# Patient Record
Sex: FEMALE | Race: BLACK OR AFRICAN AMERICAN | HISPANIC OR LATINO | Employment: FULL TIME | URBAN - METROPOLITAN AREA
[De-identification: names, ages, dates, MRNs, and addresses within clinical notes are randomized per-mention and may not be internally consistent; named-entity substitution may affect disease eponyms.]

---

## 2018-06-03 ENCOUNTER — HOSPITAL ENCOUNTER (INPATIENT)
Facility: HOSPITAL | Age: 38
LOS: 2 days | Discharge: HOME | DRG: 176 | End: 2018-06-05
Attending: EMERGENCY MEDICINE | Admitting: EMERGENCY MEDICINE

## 2018-06-03 DIAGNOSIS — R07.9 CHEST PAIN: ICD-10-CM

## 2018-06-03 DIAGNOSIS — I50.30 (HFPEF) HEART FAILURE WITH PRESERVED EJECTION FRACTION: ICD-10-CM

## 2018-06-03 DIAGNOSIS — I26.99 OTHER PULMONARY EMBOLISM WITHOUT ACUTE COR PULMONALE, UNSPECIFIED CHRONICITY: ICD-10-CM

## 2018-06-03 DIAGNOSIS — R79.89 ELEVATED LFTS: ICD-10-CM

## 2018-06-03 DIAGNOSIS — R07.9 RIGHT-SIDED CHEST PAIN: ICD-10-CM

## 2018-06-03 DIAGNOSIS — I26.99 PULMONARY EMBOLI: ICD-10-CM

## 2018-06-03 DIAGNOSIS — R06.02 SHORTNESS OF BREATH: ICD-10-CM

## 2018-06-03 DIAGNOSIS — I26.99 OTHER ACUTE PULMONARY EMBOLISM WITHOUT ACUTE COR PULMONALE: Primary | ICD-10-CM

## 2018-06-03 LAB
ALBUMIN SERPL BCP-MCNC: 3.2 G/DL
ALP SERPL-CCNC: 146 U/L
ALT SERPL W/O P-5'-P-CCNC: 53 U/L
ANION GAP SERPL CALC-SCNC: 10 MMOL/L
APTT BLDCRRT: 24.7 SEC
APTT BLDCRRT: 30.3 SEC
AST SERPL-CCNC: 62 U/L
B-HCG UR QL: NEGATIVE
BACTERIA #/AREA URNS HPF: NORMAL /HPF
BASOPHILS # BLD AUTO: 0.02 K/UL
BASOPHILS NFR BLD: 0.2 %
BILIRUB SERPL-MCNC: 0.8 MG/DL
BILIRUB UR QL STRIP: NEGATIVE
BUN SERPL-MCNC: 6 MG/DL
CALCIUM SERPL-MCNC: 9.7 MG/DL
CHLORIDE SERPL-SCNC: 95 MMOL/L
CLARITY UR: CLEAR
CO2 SERPL-SCNC: 26 MMOL/L
COLOR UR: YELLOW
CREAT SERPL-MCNC: 0.8 MG/DL
CTP QC/QA: YES
D DIMER PPP IA.FEU-MCNC: 2.19 MG/L FEU
DIFFERENTIAL METHOD: ABNORMAL
EOSINOPHIL # BLD AUTO: 0.2 K/UL
EOSINOPHIL NFR BLD: 2.1 %
ERYTHROCYTE [DISTWIDTH] IN BLOOD BY AUTOMATED COUNT: 14.7 %
EST. GFR  (AFRICAN AMERICAN): >60 ML/MIN/1.73 M^2
EST. GFR  (NON AFRICAN AMERICAN): >60 ML/MIN/1.73 M^2
GLUCOSE SERPL-MCNC: 371 MG/DL
GLUCOSE UR QL STRIP: ABNORMAL
HCT VFR BLD AUTO: 41.3 %
HGB BLD-MCNC: 14.4 G/DL
HGB UR QL STRIP: NEGATIVE
INR PPP: 0.9
KETONES UR QL STRIP: NEGATIVE
LEUKOCYTE ESTERASE UR QL STRIP: NEGATIVE
LIPASE SERPL-CCNC: 10 U/L
LYMPHOCYTES # BLD AUTO: 2.3 K/UL
LYMPHOCYTES NFR BLD: 22.9 %
MCH RBC QN AUTO: 32 PG
MCHC RBC AUTO-ENTMCNC: 34.9 G/DL
MCV RBC AUTO: 92 FL
MICROSCOPIC COMMENT: NORMAL
MONOCYTES # BLD AUTO: 1.3 K/UL
MONOCYTES NFR BLD: 12.7 %
NEUTROPHILS # BLD AUTO: 6.2 K/UL
NEUTROPHILS NFR BLD: 62.1 %
NITRITE UR QL STRIP: NEGATIVE
PH UR STRIP: 7 [PH] (ref 5–8)
PLATELET # BLD AUTO: 122 K/UL
PMV BLD AUTO: 10.7 FL
POCT GLUCOSE: 349 MG/DL (ref 70–110)
POTASSIUM SERPL-SCNC: 3.9 MMOL/L
PROT SERPL-MCNC: 7.3 G/DL
PROT UR QL STRIP: NEGATIVE
PROTHROMBIN TIME: 9.7 SEC
RBC # BLD AUTO: 4.5 M/UL
SODIUM SERPL-SCNC: 131 MMOL/L
SP GR UR STRIP: 1.03 (ref 1–1.03)
SQUAMOUS #/AREA URNS HPF: 1 /HPF
TROPONIN I SERPL DL<=0.01 NG/ML-MCNC: <0.006 NG/ML
URN SPEC COLLECT METH UR: ABNORMAL
UROBILINOGEN UR STRIP-ACNC: ABNORMAL EU/DL
WBC # BLD AUTO: 9.96 K/UL
WBC #/AREA URNS HPF: 1 /HPF (ref 0–5)
YEAST URNS QL MICRO: NORMAL

## 2018-06-03 PROCEDURE — 81000 URINALYSIS NONAUTO W/SCOPE: CPT

## 2018-06-03 PROCEDURE — 63600175 PHARM REV CODE 636 W HCPCS: Performed by: EMERGENCY MEDICINE

## 2018-06-03 PROCEDURE — 25000003 PHARM REV CODE 250: Performed by: EMERGENCY MEDICINE

## 2018-06-03 PROCEDURE — 93306 TTE W/DOPPLER COMPLETE: CPT

## 2018-06-03 PROCEDURE — 63600175 PHARM REV CODE 636 W HCPCS: Performed by: HOSPITALIST

## 2018-06-03 PROCEDURE — 27000221 HC OXYGEN, UP TO 24 HOURS

## 2018-06-03 PROCEDURE — 81025 URINE PREGNANCY TEST: CPT

## 2018-06-03 PROCEDURE — 36415 COLL VENOUS BLD VENIPUNCTURE: CPT

## 2018-06-03 PROCEDURE — 81025 URINE PREGNANCY TEST: CPT | Performed by: NURSE PRACTITIONER

## 2018-06-03 PROCEDURE — 85730 THROMBOPLASTIN TIME PARTIAL: CPT | Mod: 91

## 2018-06-03 PROCEDURE — 94761 N-INVAS EAR/PLS OXIMETRY MLT: CPT

## 2018-06-03 PROCEDURE — 85610 PROTHROMBIN TIME: CPT

## 2018-06-03 PROCEDURE — 96361 HYDRATE IV INFUSION ADD-ON: CPT

## 2018-06-03 PROCEDURE — 85730 THROMBOPLASTIN TIME PARTIAL: CPT

## 2018-06-03 PROCEDURE — 93010 ELECTROCARDIOGRAM REPORT: CPT | Mod: ,,, | Performed by: INTERNAL MEDICINE

## 2018-06-03 PROCEDURE — 83690 ASSAY OF LIPASE: CPT

## 2018-06-03 PROCEDURE — 84484 ASSAY OF TROPONIN QUANT: CPT

## 2018-06-03 PROCEDURE — 96374 THER/PROPH/DIAG INJ IV PUSH: CPT

## 2018-06-03 PROCEDURE — 96375 TX/PRO/DX INJ NEW DRUG ADDON: CPT

## 2018-06-03 PROCEDURE — 85025 COMPLETE CBC W/AUTO DIFF WBC: CPT

## 2018-06-03 PROCEDURE — 85379 FIBRIN DEGRADATION QUANT: CPT

## 2018-06-03 PROCEDURE — 21400001 HC TELEMETRY ROOM

## 2018-06-03 PROCEDURE — 99285 EMERGENCY DEPT VISIT HI MDM: CPT | Mod: 25

## 2018-06-03 PROCEDURE — 25500020 PHARM REV CODE 255: Performed by: EMERGENCY MEDICINE

## 2018-06-03 PROCEDURE — 93306 TTE W/DOPPLER COMPLETE: CPT | Mod: 26,,, | Performed by: INTERNAL MEDICINE

## 2018-06-03 PROCEDURE — 80053 COMPREHEN METABOLIC PANEL: CPT

## 2018-06-03 RX ORDER — INSULIN ASPART 100 [IU]/ML
0-5 INJECTION, SOLUTION INTRAVENOUS; SUBCUTANEOUS
Status: DISCONTINUED | OUTPATIENT
Start: 2018-06-03 | End: 2018-06-03

## 2018-06-03 RX ORDER — IBUPROFEN 200 MG
16 TABLET ORAL
Status: DISCONTINUED | OUTPATIENT
Start: 2018-06-03 | End: 2018-06-05 | Stop reason: HOSPADM

## 2018-06-03 RX ORDER — INSULIN ASPART 100 [IU]/ML
1-10 INJECTION, SOLUTION INTRAVENOUS; SUBCUTANEOUS
Status: DISCONTINUED | OUTPATIENT
Start: 2018-06-03 | End: 2018-06-05 | Stop reason: HOSPADM

## 2018-06-03 RX ORDER — GLUCAGON 1 MG
1 KIT INJECTION
Status: DISCONTINUED | OUTPATIENT
Start: 2018-06-03 | End: 2018-06-05 | Stop reason: HOSPADM

## 2018-06-03 RX ORDER — MORPHINE SULFATE 10 MG/ML
4 INJECTION INTRAMUSCULAR; INTRAVENOUS; SUBCUTANEOUS EVERY 4 HOURS PRN
Status: DISCONTINUED | OUTPATIENT
Start: 2018-06-03 | End: 2018-06-05 | Stop reason: HOSPADM

## 2018-06-03 RX ORDER — WARFARIN SODIUM 5 MG/1
10 TABLET ORAL DAILY
Status: DISCONTINUED | OUTPATIENT
Start: 2018-06-03 | End: 2018-06-04

## 2018-06-03 RX ORDER — SODIUM CHLORIDE 9 MG/ML
INJECTION, SOLUTION INTRAVENOUS CONTINUOUS
Status: DISCONTINUED | OUTPATIENT
Start: 2018-06-03 | End: 2018-06-04

## 2018-06-03 RX ORDER — KETOROLAC TROMETHAMINE 30 MG/ML
10 INJECTION, SOLUTION INTRAMUSCULAR; INTRAVENOUS
Status: COMPLETED | OUTPATIENT
Start: 2018-06-03 | End: 2018-06-03

## 2018-06-03 RX ORDER — HEPARIN SODIUM,PORCINE/D5W 25000/250
16 INTRAVENOUS SOLUTION INTRAVENOUS CONTINUOUS
Status: DISCONTINUED | OUTPATIENT
Start: 2018-06-03 | End: 2018-06-04

## 2018-06-03 RX ORDER — IBUPROFEN 200 MG
24 TABLET ORAL
Status: DISCONTINUED | OUTPATIENT
Start: 2018-06-03 | End: 2018-06-05 | Stop reason: HOSPADM

## 2018-06-03 RX ADMIN — HEPARIN SODIUM 18 UNITS/KG/HR: 10000 INJECTION, SOLUTION INTRAVENOUS at 07:06

## 2018-06-03 RX ADMIN — SODIUM CHLORIDE 1000 ML: 0.9 INJECTION, SOLUTION INTRAVENOUS at 10:06

## 2018-06-03 RX ADMIN — IOHEXOL 75 ML: 350 INJECTION, SOLUTION INTRAVENOUS at 09:06

## 2018-06-03 RX ADMIN — MORPHINE SULFATE 4 MG: 10 INJECTION INTRAVENOUS at 09:06

## 2018-06-03 RX ADMIN — HEPARIN SODIUM 16 UNITS/KG/HR: 10000 INJECTION, SOLUTION INTRAVENOUS at 11:06

## 2018-06-03 RX ADMIN — SODIUM CHLORIDE: 0.9 INJECTION, SOLUTION INTRAVENOUS at 09:06

## 2018-06-03 RX ADMIN — INSULIN ASPART 4 UNITS: 100 INJECTION, SOLUTION INTRAVENOUS; SUBCUTANEOUS at 09:06

## 2018-06-03 RX ADMIN — KETOROLAC TROMETHAMINE 10 MG: 30 INJECTION, SOLUTION INTRAMUSCULAR at 09:06

## 2018-06-03 RX ADMIN — SODIUM CHLORIDE: 0.9 INJECTION, SOLUTION INTRAVENOUS at 01:06

## 2018-06-03 RX ADMIN — WARFARIN SODIUM 10 MG: 5 TABLET ORAL at 05:06

## 2018-06-03 NOTE — SUBJECTIVE & OBJECTIVE
Past Medical History:   Diagnosis Date    Diabetes mellitus     Hypertension     Vaginal delivery     pt reported       History reviewed. No pertinent surgical history.    Review of patient's allergies indicates:  No Known Allergies    No current facility-administered medications on file prior to encounter.      No current outpatient prescriptions on file prior to encounter.     Family History     None        Social History Main Topics    Smoking status: Current Every Day Smoker     Packs/day: 0.50     Types: Cigarettes    Smokeless tobacco: Current User    Alcohol use No    Drug use: No    Sexual activity: Not on file     Review of Systems   Constitutional: Negative for activity change and appetite change.   HENT: Negative for congestion and dental problem.    Eyes: Negative for discharge and itching.   Respiratory: Negative for apnea and chest tightness.    Cardiovascular: Positive for chest pain. Negative for leg swelling.   Gastrointestinal: Negative for abdominal distention and abdominal pain.   Endocrine: Negative for cold intolerance and heat intolerance.   Genitourinary: Negative for difficulty urinating and dyspareunia.   Musculoskeletal: Negative for arthralgias and back pain.   Skin: Negative for color change and pallor.   Allergic/Immunologic: Negative for environmental allergies and food allergies.   Neurological: Negative for dizziness.   Hematological: Negative for adenopathy. Does not bruise/bleed easily.   Psychiatric/Behavioral: Negative for agitation and behavioral problems.     Objective:     Vital Signs (Most Recent):  Temp: 98.4 °F (36.9 °C) (06/03/18 0751)  Pulse: 66 (06/03/18 1057)  Resp: 20 (06/03/18 0751)  BP: 130/76 (06/03/18 1012)  SpO2: 100 % (06/03/18 1057) Vital Signs (24h Range):  Temp:  [98.4 °F (36.9 °C)] 98.4 °F (36.9 °C)  Pulse:  [66-99] 66  Resp:  [20] 20  SpO2:  [97 %-100 %] 100 %  BP: (121-141)/(72-78) 130/76     Weight: 65.8 kg (145 lb)  Body mass index is 24.13  kg/m².    Physical Exam   Constitutional: She is oriented to person, place, and time. No distress.   Eyes: EOM are normal. Pupils are equal, round, and reactive to light.   Neck: Normal range of motion. Neck supple.   Cardiovascular: Normal rate and regular rhythm.    Pulmonary/Chest: Effort normal and breath sounds normal.   Abdominal: Soft. Bowel sounds are normal.   Musculoskeletal: Normal range of motion. She exhibits no edema or deformity.   Neurological: She is oriented to person, place, and time. No cranial nerve deficit. Coordination normal.   Skin: Skin is warm and dry. She is not diaphoretic.   Psychiatric: She has a normal mood and affect. Her behavior is normal.         CRANIAL NERVES     CN III, IV, VI   Pupils are equal, round, and reactive to light.  Extraocular motions are normal.        Significant Labs:   BMP:   Recent Labs  Lab 06/03/18  0841   *   *   K 3.9   CL 95   CO2 26   BUN 6   CREATININE 0.8   CALCIUM 9.7     CBC:   Recent Labs  Lab 06/03/18  0841   WBC 9.96   HGB 14.4   HCT 41.3   *       Significant Imaging: reviewed.

## 2018-06-03 NOTE — ED NOTES
Transported to floor with transport team on stretcher, with personal belongings, on oxygen, with heparin infusing, on monitor, with chart

## 2018-06-03 NOTE — ED PROVIDER NOTES
Encounter Date: 6/3/2018       History     Chief Complaint   Patient presents with    Chest Pain     present to the ER with c/o R rib pain radiating to mid/upper back/shoulder, symptoms since 5 days,     Chief complaint:  Chest pain    History of present illness:  Patient is a 38-year-old female who speaks Ken[place] only who reports 5 days of right rib pain is intermittent.  She reports nothing is alleviate this pain and it radiates to the mid upper back and shoulders.  She reports current severity of pain of 10/10.  She states there is subjective fever, chills, cough.  Denies urinary symptoms such as frequency or urgency dysuria hematuria.  She states the pain is worse with a deep breath or cough.      The history is provided by the patient. No  was used.     Review of patient's allergies indicates:  No Known Allergies  Past Medical History:   Diagnosis Date    Diabetes mellitus     Hypertension     Vaginal delivery     pt reported     History reviewed. No pertinent surgical history.  History reviewed. No pertinent family history.  Social History   Substance Use Topics    Smoking status: Current Every Day Smoker     Packs/day: 0.50     Types: Cigarettes    Smokeless tobacco: Current User    Alcohol use No     Review of Systems   Constitutional: Positive for chills and fever (Subjective). Negative for fatigue.   HENT: Negative for congestion, ear discharge, ear pain, postnasal drip, rhinorrhea, sinus pressure, sneezing, sore throat and voice change.    Eyes: Negative for discharge and itching.   Respiratory: Positive for cough. Negative for shortness of breath and wheezing.    Cardiovascular: Negative for chest pain, palpitations and leg swelling.   Gastrointestinal: Negative for abdominal pain, constipation, diarrhea, nausea and vomiting.   Endocrine: Negative for polydipsia, polyphagia and polyuria.   Genitourinary: Negative for dysuria, frequency, hematuria, urgency, vaginal bleeding,  vaginal discharge and vaginal pain.   Musculoskeletal: Negative for arthralgias and myalgias.   Skin: Negative for rash and wound.   Neurological: Negative for dizziness, seizures, syncope, weakness and numbness.   Hematological: Negative for adenopathy. Does not bruise/bleed easily.   Psychiatric/Behavioral: Negative for self-injury and suicidal ideas. The patient is not nervous/anxious.        Physical Exam     Initial Vitals [06/03/18 0751]   BP Pulse Resp Temp SpO2   (!) 141/78 99 20 98.4 °F (36.9 °C) 100 %      MAP       99         Physical Exam    Nursing note and vitals reviewed.  Constitutional: She appears well-developed and well-nourished.   HENT:   Head: Normocephalic and atraumatic.   Right Ear: External ear normal.   Left Ear: External ear normal.   Nose: Nose normal.   Eyes: Conjunctivae and EOM are normal. Pupils are equal, round, and reactive to light. Right eye exhibits no discharge. Left eye exhibits no discharge.   Neck: Normal range of motion.   Cardiovascular: Regular rhythm, S1 normal, S2 normal and normal heart sounds. Exam reveals no gallop.    No murmur heard.  Pulmonary/Chest: Effort normal and breath sounds normal. No respiratory distress. She has no decreased breath sounds. She has no wheezes. She has no rhonchi. She has no rales.   Abdominal: She exhibits no distension.   Musculoskeletal: Normal range of motion.   Neurological: She is alert and oriented to person, place, and time.   Skin: Skin is dry. Capillary refill takes less than 2 seconds.   Skin is mildly diaphoretic.         ED Course   Procedures  Labs Reviewed   CBC W/ AUTO DIFFERENTIAL   COMPREHENSIVE METABOLIC PANEL   URINALYSIS, REFLEX TO URINE CULTURE   TROPONIN I   D DIMER, QUANTITATIVE   POCT URINE PREGNANCY     EKG Readings: (Independently Interpreted)   Initial Reading: No STEMI. Rhythm: Sinus Tachycardia. Heart Rate: 102. Ectopy: No Ectopy.                APC / Resident Notes:   MEDICAL DECISION MAKING    INITIAL  ASSESSMENT:  Patient is a 38-year-old female who presents with 5 days of right rib pain is intermittent.  States it radiates to the mid upper back and shoulder.  Nothing alleviates.  Aggravated by deep breath or cough.  Current severity pain is 10/10.  She reports subjective fever and chills.    Patient is afebrile, nontoxic, mildly diaphoretic, appears uncomfortable in treatment room in sitting position.  Her  accompanies her.  Heart regular rate and rhythm no murmurs clicks or rubs.  Lungs bilateral breath sounds are clear auscultation. Vital signs are reassuring.    ED COURSE:  I have ordered CBC, chemistry, urinalysis, UPT, EKG, troponin and chest x-ray    DIFFERENTIAL DIAGNOSES:  Pleuritic chest pain, rib fracture, pneumonia, ACS    PHYSICIAN INTERACTION:SBAR given to SHARONDA Tate MD at 0838. My care ends now.          Attending Attestation:     Physician Attestation Statement for NP/PA:   I have conducted a face to face encounter with this patient in addition to the NP/PA, due to Medical Complexity    Other NP/PA Attestation Additions:      Medical Decision Making: I have reviewed the case with my DANIEL and agree with the history, review of systems, physical exam, assessment and plan of care as documented by my advance practice clinician.    The results and physical exam findings were reviewed with the patient. Pt agrees with assessment, disposition and treatment plan and has no further questions or complaints at this time.    SHARONDA Tate M.D. 10:55 AM 6/3/2018           Attending ED Notes:   Pt with RUQ pain radiating into right back. Symptoms for 5 days. Acutely worse yesterday.  DDimer elevated. CT with PE and possible infarction. Starting heparin gtt. Risk factor is estrogen implant. Will obtain US lower ext.   I discussed the patient's presentation and workup with the hospitalist who agrees with placing the patient in the hospital.  I have placed orders for the hospitalist.   The results and  physical exam findings were reviewed with the patient. Pt agrees with assessment, disposition and treatment plan and has no further questions or complaints at this time.    SHARONDA Tate M.D. 10:55 AM 6/3/2018               Clinical Impression:   The primary encounter diagnosis was Other acute pulmonary embolism without acute cor pulmonale. Diagnoses of Chest pain and Right-sided chest pain were also pertinent to this visit.    X-Ray Chest PA And Lateral    (Results Pending)                              Fernando Tate MD  06/03/18 3940

## 2018-06-03 NOTE — ED TRIAGE NOTES
Pt presented to the ER for right sided rib pain that started five nights ago, last night the pain spread to her back and right arm. Pt reports nausea and vomiting all day on friday, but denies diarrhea.

## 2018-06-03 NOTE — ASSESSMENT & PLAN NOTE
She has been started on heparin infusion,she denies personal and family history of blood clot,no recent hospitalization or fracture or long hospital or travel,but she has left arm Nexplanon implant for birth control,for about  one year,culpprit for PE,she is stable on NC O 2.  Consult surgery to remove implant,  Check leg for DVT.  Echo to R/O heat strain.

## 2018-06-03 NOTE — ED NOTES
Pt transferred to room 11 in the main ED, report given to Karolina PLUNKETT. Pt ambulated to the bathroom for urine sample.

## 2018-06-03 NOTE — H&P
[facility] Hospital Medicine  History & Physical    Patient Name: Elyssa Parra  MRN: [id number]  Admission Date: 6/3/2018  Attending Physician: Fernando Tate MD   Primary Care Provider: To Obtain Unable         Patient information was obtained from patient and ER records.     Subjective:     Principal Problem:Pulmonary embolus    Chief Complaint:   Chief Complaint   Patient presents with    Chest Pain     present to the ER with c/o R rib pain radiating to mid/upper back/shoulder, symptoms since 5 days,        HPI: Patient is a 38-year-old female with past medical history of DM,who speaks [place]an only who reports 5 days of right rib pain is intermittent.  She reports nothing is alleviate this pain and it radiates to the mid upper back and shoulders.  She reports current severity of pain of 10/10.  She states there is subjective fever, chills, cough.  Denies urinary symptoms such as frequency or urgency dysuria hematuria.  She states the pain is worse with a deep breath or cough.CTA chest  Show,  Extensive pulmonary embolism involving the lobar arteries throughout the right lung.  No evidence of right heart strain.  There is associated opacity in the right lower lobe which may represent pulmonary infarction versus infectious/inflammatory process.  She has been started on heparin infusion,she denies personal and family history of blood clot,no recent hospitalization or fracture or long hospital or travel,but she has left arm Nexplanon implant for birth control,for about  one year,culpprit for PE,she is stable on NC O 2.    Past Medical History:   Diagnosis Date    Diabetes mellitus     Hypertension     Vaginal delivery     pt reported       History reviewed. No pertinent surgical history.    Review of patient's allergies indicates:  No Known Allergies    No current facility-administered medications on file prior to encounter.      No current outpatient prescriptions on file prior to  encounter.     Family History     None        Social History Main Topics    Smoking status: Current Every Day Smoker     Packs/day: 0.50     Types: Cigarettes    Smokeless tobacco: Current User    Alcohol use No    Drug use: No    Sexual activity: Not on file     Review of Systems   Constitutional: Negative for activity change and appetite change.   HENT: Negative for congestion and dental problem.    Eyes: Negative for discharge and itching.   Respiratory: Negative for apnea and chest tightness.    Cardiovascular: Positive for chest pain. Negative for leg swelling.   Gastrointestinal: Negative for abdominal distention and abdominal pain.   Endocrine: Negative for cold intolerance and heat intolerance.   Genitourinary: Negative for difficulty urinating and dyspareunia.   Musculoskeletal: Negative for arthralgias and back pain.   Skin: Negative for color change and pallor.   Allergic/Immunologic: Negative for environmental allergies and food allergies.   Neurological: Negative for dizziness.   Hematological: Negative for adenopathy. Does not bruise/bleed easily.   Psychiatric/Behavioral: Negative for agitation and behavioral problems.     Objective:     Vital Signs (Most Recent):  Temp: 98.4 °F (36.9 °C) (06/03/18 0751)  Pulse: 66 (06/03/18 1057)  Resp: 20 (06/03/18 0751)  BP: 130/76 (06/03/18 1012)  SpO2: 100 % (06/03/18 1057) Vital Signs (24h Range):  Temp:  [98.4 °F (36.9 °C)] 98.4 °F (36.9 °C)  Pulse:  [66-99] 66  Resp:  [20] 20  SpO2:  [97 %-100 %] 100 %  BP: (121-141)/(72-78) 130/76     Weight: 65.8 kg (145 lb)  Body mass index is 24.13 kg/m².    Physical Exam   Constitutional: She is oriented to person, place, and time. No distress.   Eyes: EOM are normal. Pupils are equal, round, and reactive to light.   Neck: Normal range of motion. Neck supple.   Cardiovascular: Normal rate and regular rhythm.    Pulmonary/Chest: Effort normal and breath sounds normal.   Abdominal: Soft. Bowel sounds are normal.    Musculoskeletal: Normal range of motion. She exhibits no edema or deformity.   Neurological: She is oriented to person, place, and time. No cranial nerve deficit. Coordination normal.   Skin: Skin is warm and dry. She is not diaphoretic.   Psychiatric: She has a normal mood and affect. Her behavior is normal.         CRANIAL NERVES     CN III, IV, VI   Pupils are equal, round, and reactive to light.  Extraocular motions are normal.        Significant Labs:   BMP:   Recent Labs  Lab 06/03/18  0841   *   *   K 3.9   CL 95   CO2 26   BUN 6   CREATININE 0.8   CALCIUM 9.7     CBC:   Recent Labs  Lab 06/03/18  0841   WBC 9.96   HGB 14.4   HCT 41.3   *       Significant Imaging: reviewed.    Assessment/Plan:     * Pulmonary embolus    She has been started on heparin infusion,she denies personal and family history of blood clot,no recent hospitalization or fracture or long hospital or travel,but she has left arm Nexplanon implant for birth control,for about  one year,culpprit for PE,she is stable on NC O 2.  Consult surgery to remove implant,  Check leg for DVT.  Echo to R/O heat strain.          DM (diabetes mellitus), secondary uncontrolled    Started on IVF and  SSI,will monitor.          Elevated LFTs    US show liver steasis,will monitor.            VTE Risk Mitigation         Ordered     heparin 25,000 units in dextrose 5% 250 mL (100 units/mL) infusion; FEMALE  Continuous      06/03/18 1015     heparin 25,000 units in dextrose 5% 250 mL (100 units/mL) bolus from bag; PRN BOLUS  As needed (PRN)      06/03/18 1015     heparin 25,000 units in dextrose 5% 250 mL (100 units/mL) bolus from bag; PRN BOLUS  As needed (PRN)      06/03/18 1015     warfarin (COUMADIN) tablet 10 mg  Daily      06/03/18 1019             Aura Gore MD  Department of Hospital Medicine   [facility]

## 2018-06-03 NOTE — HPI
Patient is a 38-year-old female with past medical history of DM,who speaks Burkinan only who reports 5 days of right rib pain is intermittent.  She reports nothing is alleviate this pain and it radiates to the mid upper back and shoulders.  She reports current severity of pain of 10/10.  She states there is subjective fever, chills, cough.  Denies urinary symptoms such as frequency or urgency dysuria hematuria.  She states the pain is worse with a deep breath or cough.CTA chest  Show,  Extensive pulmonary embolism involving the lobar arteries throughout the right lung.  No evidence of right heart strain.  There is associated opacity in the right lower lobe which may represent pulmonary infarction versus infectious/inflammatory process.  She has been started on heparin infusion,she denies personal and family history of blood clot,no recent hospitalization or fracture or long hospital or travel,but she has left arm Nexplanon implant for birth control,for about  one year,culpprit for PE,she is stable on NC O 2.

## 2018-06-03 NOTE — PLAN OF CARE
Problem: Diabetes, Type 2 (Adult)  Intervention: Support/Optimize Psychosocial Response to Condition   06/03/18 1513   Coping/Psychosocial Interventions   Supportive Measures active listening utilized;verbalization of feelings encouraged   Environmental Support calm environment promoted;environmental consistency promoted       Goal: Signs and Symptoms of Listed Potential Problems Will be Absent, Minimized or Managed (Diabetes, Type 2)  Signs and symptoms of listed potential problems will be absent, minimized or managed by discharge/transition of care (reference Diabetes, Type 2 (Adult) CPG).    06/03/18 1513   Diabetes, Type 2   Problems Assessed (Type 2 Diabetes) hyperglycemia;hypoglycemia;situational response   Problems Present (Type 2 Diabetes) hyperglycemia;hypoglycemia;situational response

## 2018-06-04 ENCOUNTER — ANESTHESIA (OUTPATIENT)
Dept: SURGERY | Facility: HOSPITAL | Age: 38
DRG: 176 | End: 2018-06-04

## 2018-06-04 ENCOUNTER — ANESTHESIA EVENT (OUTPATIENT)
Dept: SURGERY | Facility: HOSPITAL | Age: 38
DRG: 176 | End: 2018-06-04

## 2018-06-04 LAB
ALBUMIN SERPL BCP-MCNC: 2.6 G/DL
ALP SERPL-CCNC: 110 U/L
ALT SERPL W/O P-5'-P-CCNC: 42 U/L
ANION GAP SERPL CALC-SCNC: 9 MMOL/L
APTT BLDCRRT: 27.8 SEC
APTT BLDCRRT: 35.8 SEC
APTT BLDCRRT: 49.6 SEC
AST SERPL-CCNC: 46 U/L
BASOPHILS # BLD AUTO: 0.03 K/UL
BASOPHILS # BLD AUTO: 0.04 K/UL
BASOPHILS NFR BLD: 0.3 %
BASOPHILS NFR BLD: 0.4 %
BILIRUB SERPL-MCNC: 0.3 MG/DL
BUN SERPL-MCNC: 4 MG/DL
CALCIUM SERPL-MCNC: 8.6 MG/DL
CHLORIDE SERPL-SCNC: 108 MMOL/L
CO2 SERPL-SCNC: 24 MMOL/L
CREAT SERPL-MCNC: 0.6 MG/DL
DIASTOLIC DYSFUNCTION: NO
DIFFERENTIAL METHOD: ABNORMAL
DIFFERENTIAL METHOD: ABNORMAL
EOSINOPHIL # BLD AUTO: 0.2 K/UL
EOSINOPHIL # BLD AUTO: 0.2 K/UL
EOSINOPHIL NFR BLD: 1.7 %
EOSINOPHIL NFR BLD: 1.9 %
ERYTHROCYTE [DISTWIDTH] IN BLOOD BY AUTOMATED COUNT: 14.8 %
ERYTHROCYTE [DISTWIDTH] IN BLOOD BY AUTOMATED COUNT: 14.8 %
EST. GFR  (AFRICAN AMERICAN): >60 ML/MIN/1.73 M^2
EST. GFR  (NON AFRICAN AMERICAN): >60 ML/MIN/1.73 M^2
ESTIMATED PA SYSTOLIC PRESSURE: 22.71
FACT X PPP CHRO-ACNC: 0.49 IU/ML
GLUCOSE SERPL-MCNC: 181 MG/DL
HCT VFR BLD AUTO: 37.4 %
HCT VFR BLD AUTO: 38.7 %
HGB BLD-MCNC: 12.9 G/DL
HGB BLD-MCNC: 13.4 G/DL
INR PPP: 1
LYMPHOCYTES # BLD AUTO: 3.2 K/UL
LYMPHOCYTES # BLD AUTO: 3.4 K/UL
LYMPHOCYTES NFR BLD: 31.5 %
LYMPHOCYTES NFR BLD: 33.1 %
MCH RBC QN AUTO: 31.8 PG
MCH RBC QN AUTO: 31.9 PG
MCHC RBC AUTO-ENTMCNC: 34.5 G/DL
MCHC RBC AUTO-ENTMCNC: 34.6 G/DL
MCV RBC AUTO: 92 FL
MCV RBC AUTO: 92 FL
MONOCYTES # BLD AUTO: 1.1 K/UL
MONOCYTES # BLD AUTO: 1.3 K/UL
MONOCYTES NFR BLD: 11.4 %
MONOCYTES NFR BLD: 12.5 %
NEUTROPHILS # BLD AUTO: 5.3 K/UL
NEUTROPHILS # BLD AUTO: 5.5 K/UL
NEUTROPHILS NFR BLD: 52.1 %
NEUTROPHILS NFR BLD: 55.1 %
PLATELET # BLD AUTO: 117 K/UL
PLATELET # BLD AUTO: 132 K/UL
PMV BLD AUTO: 10.7 FL
PMV BLD AUTO: 11.1 FL
POCT GLUCOSE: 111 MG/DL (ref 70–110)
POCT GLUCOSE: 153 MG/DL (ref 70–110)
POCT GLUCOSE: 173 MG/DL (ref 70–110)
POCT GLUCOSE: 259 MG/DL (ref 70–110)
POTASSIUM SERPL-SCNC: 3.5 MMOL/L
PROT SERPL-MCNC: 5.8 G/DL
PROTHROMBIN TIME: 10.9 SEC
RBC # BLD AUTO: 4.05 M/UL
RBC # BLD AUTO: 4.22 M/UL
RETIRED EF AND QEF - SEE NOTES: 55 (ref 55–65)
SODIUM SERPL-SCNC: 141 MMOL/L
TRICUSPID VALVE REGURGITATION: NORMAL
WBC # BLD AUTO: 10.02 K/UL
WBC # BLD AUTO: 10.15 K/UL

## 2018-06-04 PROCEDURE — 85520 HEPARIN ASSAY: CPT

## 2018-06-04 PROCEDURE — 63600175 PHARM REV CODE 636 W HCPCS: Performed by: EMERGENCY MEDICINE

## 2018-06-04 PROCEDURE — 36000707: Performed by: SURGERY

## 2018-06-04 PROCEDURE — 21400001 HC TELEMETRY ROOM

## 2018-06-04 PROCEDURE — 85730 THROMBOPLASTIN TIME PARTIAL: CPT | Mod: 91

## 2018-06-04 PROCEDURE — 25000003 PHARM REV CODE 250: Performed by: REGISTERED NURSE

## 2018-06-04 PROCEDURE — 63600175 PHARM REV CODE 636 W HCPCS: Performed by: HOSPITALIST

## 2018-06-04 PROCEDURE — 85730 THROMBOPLASTIN TIME PARTIAL: CPT

## 2018-06-04 PROCEDURE — 85610 PROTHROMBIN TIME: CPT

## 2018-06-04 PROCEDURE — 71000033 HC RECOVERY, INTIAL HOUR: Performed by: SURGERY

## 2018-06-04 PROCEDURE — 80053 COMPREHEN METABOLIC PANEL: CPT

## 2018-06-04 PROCEDURE — D9220A PRA ANESTHESIA: Mod: ,,, | Performed by: ANESTHESIOLOGY

## 2018-06-04 PROCEDURE — 37000008 HC ANESTHESIA 1ST 15 MINUTES: Performed by: SURGERY

## 2018-06-04 PROCEDURE — 88300 SURGICAL PATH GROSS: CPT | Performed by: PATHOLOGY

## 2018-06-04 PROCEDURE — 63600175 PHARM REV CODE 636 W HCPCS: Performed by: REGISTERED NURSE

## 2018-06-04 PROCEDURE — 88300 SURGICAL PATH GROSS: CPT | Mod: 26,,, | Performed by: PATHOLOGY

## 2018-06-04 PROCEDURE — 37000009 HC ANESTHESIA EA ADD 15 MINS: Performed by: SURGERY

## 2018-06-04 PROCEDURE — 25000003 PHARM REV CODE 250: Performed by: EMERGENCY MEDICINE

## 2018-06-04 PROCEDURE — 0JPV0HZ REMOVAL OF CONTRACEPTIVE DEVICE FROM UPPER EXTREMITY SUBCUTANEOUS TISSUE AND FASCIA, OPEN APPROACH: ICD-10-PCS | Performed by: SURGERY

## 2018-06-04 PROCEDURE — 85025 COMPLETE CBC W/AUTO DIFF WBC: CPT | Mod: 91

## 2018-06-04 PROCEDURE — 36000706: Performed by: SURGERY

## 2018-06-04 PROCEDURE — 36415 COLL VENOUS BLD VENIPUNCTURE: CPT

## 2018-06-04 PROCEDURE — 99222 1ST HOSP IP/OBS MODERATE 55: CPT | Mod: ,,, | Performed by: INTERNAL MEDICINE

## 2018-06-04 RX ORDER — ENOXAPARIN SODIUM 100 MG/ML
1 INJECTION SUBCUTANEOUS
Status: DISCONTINUED | OUTPATIENT
Start: 2018-06-04 | End: 2018-06-04

## 2018-06-04 RX ORDER — LIDOCAINE HCL/PF 100 MG/5ML
SYRINGE (ML) INTRAVENOUS
Status: DISCONTINUED | OUTPATIENT
Start: 2018-06-04 | End: 2018-06-04

## 2018-06-04 RX ORDER — PROPOFOL 10 MG/ML
VIAL (ML) INTRAVENOUS
Status: DISCONTINUED | OUTPATIENT
Start: 2018-06-04 | End: 2018-06-04

## 2018-06-04 RX ORDER — MIDAZOLAM HYDROCHLORIDE 1 MG/ML
INJECTION, SOLUTION INTRAMUSCULAR; INTRAVENOUS
Status: DISCONTINUED | OUTPATIENT
Start: 2018-06-04 | End: 2018-06-04

## 2018-06-04 RX ORDER — ENOXAPARIN SODIUM 100 MG/ML
1 INJECTION SUBCUTANEOUS
Status: DISCONTINUED | OUTPATIENT
Start: 2018-06-04 | End: 2018-06-05

## 2018-06-04 RX ORDER — SODIUM CHLORIDE, SODIUM LACTATE, POTASSIUM CHLORIDE, CALCIUM CHLORIDE 600; 310; 30; 20 MG/100ML; MG/100ML; MG/100ML; MG/100ML
INJECTION, SOLUTION INTRAVENOUS CONTINUOUS PRN
Status: DISCONTINUED | OUTPATIENT
Start: 2018-06-04 | End: 2018-06-04

## 2018-06-04 RX ORDER — FENTANYL CITRATE 50 UG/ML
INJECTION, SOLUTION INTRAMUSCULAR; INTRAVENOUS
Status: DISCONTINUED | OUTPATIENT
Start: 2018-06-04 | End: 2018-06-04

## 2018-06-04 RX ADMIN — PROPOFOL 5 MG: 10 INJECTION, EMULSION INTRAVENOUS at 02:06

## 2018-06-04 RX ADMIN — PROPOFOL 10 MG: 10 INJECTION, EMULSION INTRAVENOUS at 02:06

## 2018-06-04 RX ADMIN — PROPOFOL 50 MG: 10 INJECTION, EMULSION INTRAVENOUS at 02:06

## 2018-06-04 RX ADMIN — LIDOCAINE HYDROCHLORIDE 20 MG: 20 INJECTION, SOLUTION INTRAVENOUS at 02:06

## 2018-06-04 RX ADMIN — SODIUM CHLORIDE: 0.9 INJECTION, SOLUTION INTRAVENOUS at 05:06

## 2018-06-04 RX ADMIN — HEPARIN SODIUM 20 UNITS/KG/HR: 10000 INJECTION, SOLUTION INTRAVENOUS at 03:06

## 2018-06-04 RX ADMIN — FENTANYL CITRATE 25 MCG: 50 INJECTION INTRAMUSCULAR; INTRAVENOUS at 03:06

## 2018-06-04 RX ADMIN — LIDOCAINE HYDROCHLORIDE 10 MG: 20 INJECTION, SOLUTION INTRAVENOUS at 02:06

## 2018-06-04 RX ADMIN — MORPHINE SULFATE 4 MG: 10 INJECTION INTRAVENOUS at 01:06

## 2018-06-04 RX ADMIN — MIDAZOLAM HYDROCHLORIDE 2 MG: 1 INJECTION, SOLUTION INTRAMUSCULAR; INTRAVENOUS at 02:06

## 2018-06-04 RX ADMIN — FENTANYL CITRATE 50 MCG: 50 INJECTION INTRAMUSCULAR; INTRAVENOUS at 02:06

## 2018-06-04 RX ADMIN — SODIUM CHLORIDE, SODIUM LACTATE, POTASSIUM CHLORIDE, AND CALCIUM CHLORIDE: .6; .31; .03; .02 INJECTION, SOLUTION INTRAVENOUS at 02:06

## 2018-06-04 RX ADMIN — PROPOFOL 25 MG: 10 INJECTION, EMULSION INTRAVENOUS at 02:06

## 2018-06-04 RX ADMIN — MORPHINE SULFATE 4 MG: 10 INJECTION INTRAVENOUS at 07:06

## 2018-06-04 RX ADMIN — LIDOCAINE HYDROCHLORIDE 50 MG: 20 INJECTION, SOLUTION INTRAVENOUS at 02:06

## 2018-06-04 RX ADMIN — INSULIN ASPART 3 UNITS: 100 INJECTION, SOLUTION INTRAVENOUS; SUBCUTANEOUS at 10:06

## 2018-06-04 RX ADMIN — ENOXAPARIN SODIUM 70 MG: 100 INJECTION SUBCUTANEOUS at 04:06

## 2018-06-04 NOTE — PROGRESS NOTES
Preferred pharnacy        [id number] - [place] - [address]  [address] [address]  Phone: [phone] Fax: [phone]

## 2018-06-04 NOTE — PLAN OF CARE
Problem: Patient Care Overview  Goal: Plan of Care Review   06/03/18 0571   Coping/Psychosocial   Plan Of Care Reviewed With patient   Pt remained free of falls during current shift. Reported having pain frequently and received multiple doses of prn IV pain medication. IV heparin infusing per MD order. Monitoring pt's PTT level and titration heparin dosage per heparin nomogram. Plan of care and fall precautions reviewed with pt and verbalized understanding. Bed locked, lowered, SR up x2 and call light placed within reach.

## 2018-06-04 NOTE — PLAN OF CARE
Problem: Fall Risk (Adult)  Intervention: Patient Rounds   06/04/18 1342   Safety Interventions   Patient Rounds bed in low position;bed wheels locked;call light in reach;clutter free environment maintained;ID band on;placement of personal items at bedside;toileting offered;visualized patient         Problem: Patient Care Overview  Goal: Plan of Care Review  Outcome: Ongoing (interventions implemented as appropriate)   06/04/18 1342   Coping/Psychosocial   Plan Of Care Reviewed With patient       Problem: Diabetes, Type 2 (Adult)  Goal: Signs and Symptoms of Listed Potential Problems Will be Absent, Minimized or Managed (Diabetes, Type 2)  Signs and symptoms of listed potential problems will be absent, minimized or managed by discharge/transition of care (reference Diabetes, Type 2 (Adult) CPG).   Outcome: Ongoing (interventions implemented as appropriate)   06/04/18 1342   Diabetes, Type 2   Problems Assessed (Type 2 Diabetes) all   Problems Present (Type 2 Diabetes) none       Problem: VTE, DVT and PE (Adult)  Goal: Signs and Symptoms of Listed Potential Problems Will be Absent, Minimized or Managed (VTE, DVT and PE)  Signs and symptoms of listed potential problems will be absent, minimized or managed by discharge/transition of care (reference VTE, DVT and PE (Adult) CPG).   Outcome: Ongoing (interventions implemented as appropriate)   06/04/18 1342   VTE, DVT and PE   Problems Assessed (VTE, DVT, PE) all   Problems Present (VTE, DVT, PE) pulmonary embolism       Problem: Pain, Acute (Adult)  Goal: Acceptable Pain Control/Comfort Level  Patient will demonstrate the desired outcomes by discharge/transition of care.   Outcome: Ongoing (interventions implemented as appropriate)   06/04/18 1342   Pain, Acute (Adult)   Acceptable Pain Control/Comfort Level making progress toward outcome

## 2018-06-04 NOTE — NURSING
Patient arrived to floor from surgery in stable condition.Visualized patient and assessed patient's overall condition and appearance. Patient AAO, VS stable. No complaints. NAD noted. Will continue to monitor.

## 2018-06-04 NOTE — PROGRESS NOTES
[facility] Hospital Medicine  Progress Note    Patient Name: Elyssa Parra  MRN: [id number]  Patient Class: IP- Inpatient   Admission Date: 6/3/2018  Length of Stay: 1 days  Attending Physician: Aura Gore MD  Primary Care Provider: To Obtain Unable        Subjective:     Principal Problem:Pulmonary embolus    HPI:  Patient is a 38-year-old female with past medical history of DM,who speaks Georgian only who reports 5 days of right rib pain is intermittent.  She reports nothing is alleviate this pain and it radiates to the mid upper back and shoulders.  She reports current severity of pain of 10/10.  She states there is subjective fever, chills, cough.  Denies urinary symptoms such as frequency or urgency dysuria hematuria.  She states the pain is worse with a deep breath or cough.CTA chest  Show,  Extensive pulmonary embolism involving the lobar arteries throughout the right lung.  No evidence of right heart strain.  There is associated opacity in the right lower lobe which may represent pulmonary infarction versus infectious/inflammatory process.  She has been started on heparin infusion,she denies personal and family history of blood clot,no recent hospitalization or fracture or long hospital or travel,but she has left arm Nexplanon implant for birth control,for about  one year,culpprit for PE,she is stable on NC O 2.    Hospital Course:  Patient is a 38-year-old female with past medical history of DM,who speaks Georgian only who reports 5 days of right rib pain is intermittent.  She reports nothing is alleviate this pain and it radiates to the mid upper back and shoulders.  She reports current severity of pain of 10/10.  She states there is subjective fever, chills, cough.  Denies urinary symptoms such as frequency or urgency dysuria hematuria.  She states the pain is worse with a deep breath or cough.CTA chest  Show,  Extensive pulmonary embolism involving the lobar arteries  throughout the right lung.  No evidence of right heart strain.  There is associated opacity in the right lower lobe which may represent pulmonary infarction versus infectious/inflammatory process.  She has been started on heparin infusion,she denies personal and family history of blood clot,no recent hospitalization or fracture or long hospital or travel,but she has left arm Nexplanon implant for birth control,for about  one year,culpprit for PE,she is currently stable on RA,chnaged heparin drip to lovenox.  Surgery removing implant today,anticoagulation on hold,  Consulted SW for NOAK arrangement,  discussed with pulmonary.      Past Medical History:   Diagnosis Date    Diabetes mellitus     Hypertension     Vaginal delivery     pt reported       History reviewed. No pertinent surgical history.    Review of patient's allergies indicates:  No Known Allergies    No current facility-administered medications on file prior to encounter.      No current outpatient prescriptions on file prior to encounter.     Family History     None        Social History Main Topics    Smoking status: Current Every Day Smoker     Packs/day: 0.50     Types: Cigarettes    Smokeless tobacco: Current User    Alcohol use No    Drug use: No    Sexual activity: Not on file     Review of Systems   Constitutional: Negative for activity change and appetite change.   HENT: Negative for congestion and dental problem.    Eyes: Negative for discharge and itching.   Respiratory: Negative for apnea and chest tightness.    Cardiovascular: Positive for chest pain. Negative for leg swelling.   Gastrointestinal: Negative for abdominal distention and abdominal pain.   Endocrine: Negative for cold intolerance and heat intolerance.   Genitourinary: Negative for difficulty urinating and dyspareunia.   Musculoskeletal: Negative for arthralgias and back pain.   Skin: Negative for color change and pallor.   Allergic/Immunologic: Negative for environmental  allergies and food allergies.   Neurological: Negative for dizziness.   Hematological: Negative for adenopathy. Does not bruise/bleed easily.   Psychiatric/Behavioral: Negative for agitation and behavioral problems.     Objective:     Vital Signs (Most Recent):  Temp: 98.6 °F (37 °C) (06/04/18 1131)  Pulse: 70 (06/04/18 1131)  Resp: 18 (06/04/18 1131)  BP: 121/68 (06/04/18 1131)  SpO2: 96 % (06/04/18 1131) Vital Signs (24h Range):  Temp:  [98.4 °F (36.9 °C)-99.1 °F (37.3 °C)] 98.6 °F (37 °C)  Pulse:  [68-93] 70  Resp:  [18] 18  SpO2:  [96 %-100 %] 96 %  BP: (121-154)/(68-84) 121/68     Weight: 68.7 kg (151 lb 7.3 oz)  Body mass index is 25.2 kg/m².    Physical Exam   Constitutional: She is oriented to person, place, and time. No distress.   Eyes: EOM are normal. Pupils are equal, round, and reactive to light.   Neck: Normal range of motion. Neck supple.   Cardiovascular: Normal rate and regular rhythm.    Pulmonary/Chest: Effort normal and breath sounds normal.   Abdominal: Soft. Bowel sounds are normal.   Musculoskeletal: Normal range of motion. She exhibits no edema or deformity.   Neurological: She is oriented to person, place, and time. No cranial nerve deficit. Coordination normal.   Skin: Skin is warm and dry. She is not diaphoretic.   Psychiatric: She has a normal mood and affect. Her behavior is normal.         CRANIAL NERVES     CN III, IV, VI   Pupils are equal, round, and reactive to light.  Extraocular motions are normal.        Significant Labs:   BMP:     Recent Labs  Lab 06/04/18  0239   *      K 3.5      CO2 24   BUN 4*   CREATININE 0.6   CALCIUM 8.6*     CBC:     Recent Labs  Lab 06/03/18  0841 06/04/18  0239 06/04/18  0459   WBC 9.96 10.02 10.15   HGB 14.4 12.9 13.4   HCT 41.3 37.4 38.7   * 117* 132*       Significant Imaging: reviewed.    Assessment/Plan:      * Pulmonary embolus    She has been started on heparin infusion,she denies personal and family history of blood  clot,no recent hospitalization or fracture or long hospital or travel,but she has left arm Nexplanon implant for birth control,for about  one year,culpprit for PE,she is stable on RA.swiotched to lovenox.consulted SW for NOAK arrangement.  Consult surgery to remove implant,will be done today,antocoagulation on hold.  UsS show no leg  DVT.  Echo to R/Oed out  heat strain.has normal EF.        DM (diabetes mellitus), secondary uncontrolled    Started on IVF and  SSI,will monitor.          Elevated LFTs    US show liver steasis,will monitor.            VTE Risk Mitigation         Ordered     enoxaparin injection 70 mg  Every 24 hours (non-standard times)      06/04/18 1211     IP VTE HIGH RISK PATIENT  Once      06/03/18 1222     Place sequential compression device  Until discontinued      06/03/18 1222     Place TEO hose  Until discontinued      06/03/18 1222              Aura Gore MD  Department of Hospital Medicine   [facility]

## 2018-06-04 NOTE — HPI
Ms. Parra is a 38 year old woman with a history of DM presenting with right rib pain.  CTA chest performed in the ED showed extensive PE through out the right lung.    [facility]  used to obtain history:   #[id number]    The patient reports 1 week of right sided rib/flank pain that radiates to her back.   The pain is sharp- but comes and goes.  It is pleuritic.  She denies any respiratory difficulties prior to this event.  She currently has episodic shortness of breath when her pain intensifies.  She reports subjective fever and chills a few days prior to admit, but denies cough or sinus issues.  No recent leg swelling.    No recent trauma   No prolonged immobilization.  No history or family history of clotting disorder.  Nexplanon implant for birth control. She has been on some form of hormonal birth control for the last 3 years.    States she sees physicians at [facility].    She reports recent unintentional weight loss that began when she started medication for DM (Metformin).  She smokes 10-15 cigarettes per day for the last 20 years.  She reports that she is up to date with mammography and pap smear.    She is originally from [place] and has been living in the  for the last 3 years.

## 2018-06-04 NOTE — ANESTHESIA PREPROCEDURE EVALUATION
06/04/2018  Elyssa Parra is a 38 y.o., female.    Anesthesia Evaluation    I have reviewed the Patient Summary Reports.    I have reviewed the Nursing Notes.   I have reviewed the Medications.     Review of Systems  Anesthesia Hx:  No problems with previous Anesthesia Denies Hx of Anesthetic complications  Neg history of prior surgery. Denies Family Hx of Anesthesia complications.   Denies Personal Hx of Anesthesia complications.   Social:  No Alcohol Use, Smoker    Hematology/Oncology:  Hematology Normal   Oncology Normal     EENT/Dental:EENT/Dental Normal   Cardiovascular:   Exercise tolerance: good Hypertension    Pulmonary:  Pulmonary Normal Acute PE's on heparin gtt.   Renal/:  Renal/ Normal     Hepatic/GI:  Hepatic/GI Normal    Musculoskeletal:  Musculoskeletal Normal    Neurological:  Neurology Normal    Endocrine:   Diabetes, type 2    Dermatological:  Skin Normal    Psych:  Psychiatric Normal           Physical Exam  General:  Well nourished    Airway/Jaw/Neck:  Airway Findings: Mouth Opening: Normal General Airway Assessment: Adult  Mallampati: II  TM Distance: Normal, at least 6 cm         Dental:  DENTAL FINDINGS: Normal   Chest/Lungs:  Chest/Lungs Clear    Heart/Vascular:  Heart Findings: Normal Heart murmur: negative       Mental Status:  Mental Status Findings:  Cooperative, Alert and Oriented         Anesthesia Plan  Type of Anesthesia, risks & benefits discussed:  Anesthesia Type:  MAC  Patient's Preference:   Intra-op Monitoring Plan:   Intra-op Monitoring Plan Comments:   Post Op Pain Control Plan:   Post Op Pain Control Plan Comments:   Induction:   IV  Beta Blocker:  Patient is not currently on a Beta-Blocker (No further documentation required).       Informed Consent: Patient understands risks and agrees with Anesthesia plan.  Questions answered. Anesthesia consent signed with  patient.  ASA Score: 2     Day of Surgery Review of History & Physical:        Anesthesia Plan Notes: 38 year old female admitted with acute pulmonary emboli thought to be 2/2 implanted birth control.  heparing gtt stopped at 10am.  Will proceed with local anesthesia and IV sedation. Will turn heparin gtt on immediately following procedure.        Ready For Surgery From Anesthesia Perspective.

## 2018-06-04 NOTE — NURSING
Received report from night nurse. Pt has no sign of distress. Safety precautions are maintained with bed alarm set, bed in lowest position, bed wheels locked, and call light in reach.

## 2018-06-04 NOTE — SUBJECTIVE & OBJECTIVE
Past Medical History:   Diagnosis Date    Diabetes mellitus     Hypertension     Vaginal delivery     pt reported       History reviewed. No pertinent surgical history.    Review of patient's allergies indicates:  No Known Allergies    No current facility-administered medications on file prior to encounter.      No current outpatient prescriptions on file prior to encounter.     Family History     None        Social History Main Topics    Smoking status: Current Every Day Smoker     Packs/day: 0.50     Types: Cigarettes    Smokeless tobacco: Current User    Alcohol use No    Drug use: No    Sexual activity: Not on file     Review of Systems   Constitutional: Negative for activity change and appetite change.   HENT: Negative for congestion and dental problem.    Eyes: Negative for discharge and itching.   Respiratory: Negative for apnea and chest tightness.    Cardiovascular: Positive for chest pain. Negative for leg swelling.   Gastrointestinal: Negative for abdominal distention and abdominal pain.   Endocrine: Negative for cold intolerance and heat intolerance.   Genitourinary: Negative for difficulty urinating and dyspareunia.   Musculoskeletal: Negative for arthralgias and back pain.   Skin: Negative for color change and pallor.   Allergic/Immunologic: Negative for environmental allergies and food allergies.   Neurological: Negative for dizziness.   Hematological: Negative for adenopathy. Does not bruise/bleed easily.   Psychiatric/Behavioral: Negative for agitation and behavioral problems.     Objective:     Vital Signs (Most Recent):  Temp: 98.6 °F (37 °C) (06/04/18 1131)  Pulse: 70 (06/04/18 1131)  Resp: 18 (06/04/18 1131)  BP: 121/68 (06/04/18 1131)  SpO2: 96 % (06/04/18 1131) Vital Signs (24h Range):  Temp:  [98.4 °F (36.9 °C)-99.1 °F (37.3 °C)] 98.6 °F (37 °C)  Pulse:  [68-93] 70  Resp:  [18] 18  SpO2:  [96 %-100 %] 96 %  BP: (121-154)/(68-84) 121/68     Weight: 68.7 kg (151 lb 7.3 oz)  Body mass  index is 25.2 kg/m².    Physical Exam   Constitutional: She is oriented to person, place, and time. No distress.   Eyes: EOM are normal. Pupils are equal, round, and reactive to light.   Neck: Normal range of motion. Neck supple.   Cardiovascular: Normal rate and regular rhythm.    Pulmonary/Chest: Effort normal and breath sounds normal.   Abdominal: Soft. Bowel sounds are normal.   Musculoskeletal: Normal range of motion. She exhibits no edema or deformity.   Neurological: She is oriented to person, place, and time. No cranial nerve deficit. Coordination normal.   Skin: Skin is warm and dry. She is not diaphoretic.   Psychiatric: She has a normal mood and affect. Her behavior is normal.         CRANIAL NERVES     CN III, IV, VI   Pupils are equal, round, and reactive to light.  Extraocular motions are normal.        Significant Labs:   BMP:     Recent Labs  Lab 06/04/18  0239   *      K 3.5      CO2 24   BUN 4*   CREATININE 0.6   CALCIUM 8.6*     CBC:     Recent Labs  Lab 06/03/18  0841 06/04/18  0239 06/04/18  0459   WBC 9.96 10.02 10.15   HGB 14.4 12.9 13.4   HCT 41.3 37.4 38.7   * 117* 132*       Significant Imaging: reviewed.

## 2018-06-04 NOTE — SUBJECTIVE & OBJECTIVE
Past Medical History:   Diagnosis Date    Diabetes mellitus     Hypertension     Vaginal delivery     pt reported       History reviewed. No pertinent surgical history.    Review of patient's allergies indicates:  No Known Allergies    Family History     None        Social History Main Topics    Smoking status: Current Every Day Smoker     Packs/day: 0.50     Types: Cigarettes    Smokeless tobacco: Current User    Alcohol use No    Drug use: No    Sexual activity: Not on file         Review of Systems   Constitutional: Positive for fever (subjective) and unexpected weight change.   HENT: Negative.  Negative for congestion, postnasal drip, rhinorrhea and sinus pain.    Eyes: Negative.    Respiratory: Negative.  Negative for cough, chest tightness, wheezing and stridor.    Cardiovascular: Negative.  Negative for chest pain and leg swelling.   Gastrointestinal: Negative for abdominal distention, abdominal pain, diarrhea and nausea.   Endocrine: Negative.    Genitourinary: Negative.    Musculoskeletal: Positive for back pain (pleuritic). Negative for arthralgias.   Skin: Negative.    Allergic/Immunologic: Negative.    Neurological: Negative.    Hematological: Negative.    Psychiatric/Behavioral: Negative.      Objective:     Vital Signs (Most Recent):  Temp: 98.6 °F (37 °C) (06/04/18 1131)  Pulse: 70 (06/04/18 1131)  Resp: 18 (06/04/18 1131)  BP: 121/68 (06/04/18 1131)  SpO2: 96 % (06/04/18 1131) Vital Signs (24h Range):  Temp:  [98.4 °F (36.9 °C)-99.1 °F (37.3 °C)] 98.6 °F (37 °C)  Pulse:  [68-93] 70  Resp:  [18] 18  SpO2:  [96 %-100 %] 96 %  BP: (121-154)/(68-84) 121/68     Weight: 68.7 kg (151 lb 7.3 oz)  Body mass index is 25.2 kg/m².      Intake/Output Summary (Last 24 hours) at 06/04/18 1136  Last data filed at 06/04/18 0405   Gross per 24 hour   Intake              360 ml   Output              600 ml   Net             -240 ml       Physical Exam   Constitutional: She is oriented to person, place, and  time.   HENT:   Head: Normocephalic and atraumatic.   Dentures  MP III   Eyes: Conjunctivae and EOM are normal. Pupils are equal, round, and reactive to light.   Neck: Normal range of motion. Neck supple. No tracheal deviation present.   Cardiovascular: Normal rate, regular rhythm and normal heart sounds.    Pulmonary/Chest: Effort normal. No stridor. She has no wheezes.   Faint crackles at right base   Abdominal: Soft. Bowel sounds are normal. She exhibits no distension. There is no tenderness.   Musculoskeletal: She exhibits no edema.   Neurological: She is alert and oriented to person, place, and time.   Skin: Skin is warm and dry.   Vitals reviewed.      Vents:       Lines/Drains/Airways     Peripheral Intravenous Line                 Peripheral IV - Single Lumen 06/03/18 0840 Right Antecubital 1 day                Significant Labs:    CBC/Anemia Profile:    Recent Labs  Lab 06/03/18  0841 06/04/18  0239 06/04/18  0459   WBC 9.96 10.02 10.15   HGB 14.4 12.9 13.4   HCT 41.3 37.4 38.7   * 117* 132*   MCV 92 92 92   RDW 14.7* 14.8* 14.8*        Chemistries:    Recent Labs  Lab 06/03/18  0841 06/04/18  0239   * 141   K 3.9 3.5   CL 95 108   CO2 26 24   BUN 6 4*   CREATININE 0.8 0.6   CALCIUM 9.7 8.6*   ALBUMIN 3.2* 2.6*   PROT 7.3 5.8*   BILITOT 0.8 0.3   ALKPHOS 146* 110   ALT 53* 42   AST 62* 46*       All pertinent labs within the past 24 hours have been reviewed.    Significant Imaging:   I have reviewed and interpreted all pertinent imaging results/findings within the past 24 hours.  CT Chest shows considerable clot burden on the right with what appears to be pulmonary infarct at the base.

## 2018-06-04 NOTE — TRANSFER OF CARE
"Anesthesia Transfer of Care Note    Patient: Elyssa Parra    Procedure(s) Performed: Procedure(s) (LRB):  REMOVAL, FOREIGN BODY, UPPER EXTREMITY (Left)    Patient location: PACU    Anesthesia Type: MAC    Transport from OR: Transported from OR on room air with adequate spontaneous ventilation    Post pain: adequate analgesia    Post assessment: no apparent anesthetic complications and tolerated procedure well    Post vital signs: stable    Level of consciousness: awake and alert    Nausea/Vomiting: no nausea/vomiting    Complications: none    Transfer of care protocol was followedComments: Nurse informed that pt needs to be restarted on heparin      Last vitals:   Visit Vitals  /69   Pulse 66   Temp 36.9 °C (98.4 °F) (Oral)   Resp 20   Ht 5' 5" (1.651 m)   Wt 68.7 kg (151 lb 7.3 oz)   LMP 05/11/2018   SpO2 95%   Breastfeeding? No   BMI 25.20 kg/m²     "

## 2018-06-04 NOTE — CONSULTS
[facility]  Pulmonology  Consult Note    Patient Name: Elyssa Parra  MRN: [id number]  Admission Date: 6/3/2018  Hospital Length of Stay: 1 days  Code Status: Full Code  Attending Physician: Aura Gore MD  Primary Care Provider: To Obtain Unable   Principal Problem: Pulmonary embolus    Inpatient consult to Pulmonology  Consult performed by: AMY CHEN  Consult ordered by: DENISE MCCLENDON  Reason for consult: PE        Subjective:     HPI:  Ms. Parra is a 38 year old woman with a history of DM presenting with right rib pain.  CTA chest performed in the ED showed extensive PE through out the right lung.    [facility]  used to obtain history:   #[id number]    The patient reports 1 week of right sided rib/flank pain that radiates to her back.   The pain is sharp- but comes and goes.  It is pleuritic.  She denies any respiratory difficulties prior to this event.  She currently has episodic shortness of breath when her pain intensifies.  She reports subjective fever and chills a few days prior to admit, but denies cough or sinus issues.  No recent leg swelling.    No recent trauma   No prolonged immobilization.  No history or family history of clotting disorder.  Nexplanon implant for birth control. She has been on some form of hormonal birth control for the last 3 years.    States she sees physicians at [facility].    She reports recent unintentional weight loss that began when she started medication for DM (Metformin).  She smokes 10-15 cigarettes per day for the last 20 years.  She reports that she is up to date with mammography and pap smear.    She is originally from [place] and has been living in the  for the last 3 years.      Past Medical History:   Diagnosis Date    Diabetes mellitus     Hypertension     Vaginal delivery     pt reported       History reviewed. No pertinent surgical history.    Review of patient's allergies indicates:  No Known  Allergies    Family History     None        Social History Main Topics    Smoking status: Current Every Day Smoker     Packs/day: 0.50     Types: Cigarettes    Smokeless tobacco: Current User    Alcohol use No    Drug use: No    Sexual activity: Not on file         Review of Systems   Constitutional: Positive for fever (subjective) and unexpected weight change.   HENT: Negative.  Negative for congestion, postnasal drip, rhinorrhea and sinus pain.    Eyes: Negative.    Respiratory: Negative.  Negative for cough, chest tightness, wheezing and stridor.    Cardiovascular: Negative.  Negative for chest pain and leg swelling.   Gastrointestinal: Negative for abdominal distention, abdominal pain, diarrhea and nausea.   Endocrine: Negative.    Genitourinary: Negative.    Musculoskeletal: Positive for back pain (pleuritic). Negative for arthralgias.   Skin: Negative.    Allergic/Immunologic: Negative.    Neurological: Negative.    Hematological: Negative.    Psychiatric/Behavioral: Negative.      Objective:     Vital Signs (Most Recent):  Temp: 98.6 °F (37 °C) (06/04/18 1131)  Pulse: 70 (06/04/18 1131)  Resp: 18 (06/04/18 1131)  BP: 121/68 (06/04/18 1131)  SpO2: 96 % (06/04/18 1131) Vital Signs (24h Range):  Temp:  [98.4 °F (36.9 °C)-99.1 °F (37.3 °C)] 98.6 °F (37 °C)  Pulse:  [68-93] 70  Resp:  [18] 18  SpO2:  [96 %-100 %] 96 %  BP: (121-154)/(68-84) 121/68     Weight: 68.7 kg (151 lb 7.3 oz)  Body mass index is 25.2 kg/m².      Intake/Output Summary (Last 24 hours) at 06/04/18 1136  Last data filed at 06/04/18 0405   Gross per 24 hour   Intake              360 ml   Output              600 ml   Net             -240 ml       Physical Exam   Constitutional: She is oriented to person, place, and time.   HENT:   Head: Normocephalic and atraumatic.   Dentures  MP III   Eyes: Conjunctivae and EOM are normal. Pupils are equal, round, and reactive to light.   Neck: Normal range of motion. Neck supple. No tracheal deviation  present.   Cardiovascular: Normal rate, regular rhythm and normal heart sounds.    Pulmonary/Chest: Effort normal. No stridor. She has no wheezes.   Faint crackles at right base   Abdominal: Soft. Bowel sounds are normal. She exhibits no distension. There is no tenderness.   Musculoskeletal: She exhibits no edema.   Neurological: She is alert and oriented to person, place, and time.   Skin: Skin is warm and dry.   Vitals reviewed.      Vents:       Lines/Drains/Airways     Peripheral Intravenous Line                 Peripheral IV - Single Lumen 06/03/18 0840 Right Antecubital 1 day                Significant Labs:    CBC/Anemia Profile:    Recent Labs  Lab 06/03/18  0841 06/04/18  0239 06/04/18  0459   WBC 9.96 10.02 10.15   HGB 14.4 12.9 13.4   HCT 41.3 37.4 38.7   * 117* 132*   MCV 92 92 92   RDW 14.7* 14.8* 14.8*        Chemistries:    Recent Labs  Lab 06/03/18  0841 06/04/18  0239   * 141   K 3.9 3.5   CL 95 108   CO2 26 24   BUN 6 4*   CREATININE 0.8 0.6   CALCIUM 9.7 8.6*   ALBUMIN 3.2* 2.6*   PROT 7.3 5.8*   BILITOT 0.8 0.3   ALKPHOS 146* 110   ALT 53* 42   AST 62* 46*       All pertinent labs within the past 24 hours have been reviewed.    Significant Imaging:   I have reviewed and interpreted all pertinent imaging results/findings within the past 24 hours.  CT Chest shows considerable clot burden on the right with what appears to be pulmonary infarct at the base.    Assessment/Plan:     * Pulmonary embolus    Pulmonary emoblus- likely provoked by hormonal birth control. No sign of right heart strain on ECHO.  · Agree with removal of nexplanon implant.  Patient instructed to follow up with gynecology regarding alternative non-hormonal birth control options.  · Agree with anticoagulation  · We discussed, via , the risk of bleeding while on anticoagulation as well as the need for compliance and follow up.  · Patient currently on heparin with intention to transition to  warfarin  · Alternatively, a Xa inhibitor would be appropriate.  · Suggest a 3-6 month course of anticoagulation.              Thank you for your consult. I will sign off. Please contact us if you have any additional questions.     Yvette Alcaraz MD  Pulmonology  [facility]

## 2018-06-04 NOTE — PROGRESS NOTES
Called [employer]and verified that patient does qualify and will be eligible for 6 months free of Xarelto.  Application needs to be completed and faxed by patient and MD.  Completed application with patient with the help of her sister, Heather.  Patient declined the MARTII that I brought into the room and opted to have me speak with Heather.  Spoke with Dr Stauffer and completed application and then faxed at this time    Called patient's preferred pharmacy to see if the 30 day free trial offer would be applicable so that if the application takes some time to process, the patient would still have 30 days worth of medication. Pharmacist, Carol, stated that she is not sure if it will cover the first week of Xarelto as the first week, the patient will need 15 mg BID then 20 mg daily after.  Pharmacist asked me to fax card.  Faxed to [phone]

## 2018-06-04 NOTE — NURSING
PER handoff received from MEDARDO Trinh RN. Responds to voice and is oriented x4, however pt is Malagasy  Speaking. Family is present at pt's bedside. Denies having any pain and is in no apparent distress. NS infusing to PIV to left AC @125ml/hr and heparin infusing to PIV to right AC at 10.5ml/hr. Assessment completed per Doc Flowsheets; reference if needed. Fall and safety precautions maintained. Bed alarm activated and audible. Bed locked in lowest position, with side rails up x2. Call bell and personal items within reach. Will continue to monitor pt for any changes.

## 2018-06-04 NOTE — CONSULTS
"    [facility]          General Surgery Consultation Report    06/04/2018   3:06 PM     Elyssa Lopezmarcia  [id number]    Consultant: Dr. Gore    Consultation For: Nexplanon Excision/Removal    HPI: Case of 38 y.o.  F admitted with pulmonary embolism and Nexplanon inserted in LUE. General surgery consulted for removal given patient's PE. Pt denies any present SOB or respiratory distress. Contraceptive device was inserted one year ago. Pt has had no issues with it.     SUBJECTIVE:                                                                                                   "What cause this pulmonary embolism?"    ROS - Review performed. All pertinent positives and negatives as outlined in above HPI.     OBJECTIVE:                                                                                                     Past Medical History:   Diagnosis Date    Diabetes mellitus     Hypertension     Vaginal delivery     pt reported       History reviewed. No pertinent surgical history.    Social History     Social History    Marital status:      Spouse name: N/A    Number of children: N/A    Years of education: N/A     Occupational History    Not on file.     Social History Main Topics    Smoking status: Current Every Day Smoker     Packs/day: 0.50     Types: Cigarettes    Smokeless tobacco: Current User    Alcohol use No    Drug use: No    Sexual activity: Not on file     Other Topics Concern    Not on file     Social History Narrative    No narrative on file       History reviewed. No pertinent family history.    Review of patient's allergies indicates:  No Known Allergies    Vitals:    06/04/18 1131   BP: 121/68   Pulse: 70   Resp: 18   Temp: 98.6 °F (37 °C)       Recent Labs      06/04/18   0459   WBC  10.15   HGB  13.4   HCT  38.7   PLT  132*        Recent Labs      06/04/18   0239   NA  141   K  3.5   CL  108   CO2  24   BUN  4*   CREATININE  0.6   AST  46*   ALT  42    "     Imaging Results          US Lower Extremity Veins Bilateral (Final result)  Result time 06/03/18 11:45:36    Final result by Otis Khan DO (06/03/18 11:45:36)                 Impression:      No evidence for bilateral lower extremity deep venous thrombosis. Clinical correlation and follow up as clinically warranted.      Electronically signed by: Otis hKan DO  Date:    06/03/2018  Time:    11:45             Narrative:    CLINICAL HISTORY:  R/O DVT;    TECHNIQUE:  Duplex and color flow Doppler evaluation of the bilateral lower extremity veins was performed with grayscale graded compression, color-flow and Doppler wave form imaging ultrasound imaging    COMPARISON:  None    FINDINGS:  The bilateral common femoral, superficial femoral and popliteal veins demonstrate normal gray scale graded compression, color-flow and Doppler wave forms. The Doppler wave form imaging demonstrate normal respiratory phasicity and augmentation. No evidence for bilateral lower extremity deep venous thrombosis.                               CTA Chest Non-Coronary (Final result)  Result time 06/03/18 10:06:47    Final result by Gulshan Starr MD (06/03/18 10:06:47)                 Impression:      Extensive pulmonary embolism involving the lobar arteries throughout the right lung.  No evidence of right heart strain.  There is associated opacity in the right lower lobe which may represent pulmonary infarction versus infectious/inflammatory process.    Hepatic steatosis.    COMMUNICATION  This critical result was discovered/received at 10:02 a.m..  The critical information above was relayed directly by me by telephone to Dr. Tate on 06/03/2018 at 10:06 a.m.      Electronically signed by: Gulshan Starr MD  Date:    06/03/2018  Time:    10:06             Narrative:    EXAMINATION:  CTA CHEST NON CORONARY    CLINICAL HISTORY:  elevated ddimer;Chest pain, unspecified    TECHNIQUE:  Low dose axial images, sagittal and  coronal reformations were obtained from the thoracic inlet to the lung bases following the IV administration of 75 mL of Omnipaque 350.  Contrast timing was optimized to evaluate the pulmonary arteries.  MIP images were performed.    COMPARISON:  None    FINDINGS:  This study is adequate for the evaluation of pulmonary thromboembolism.  There is pulmonary embolism in the lobar branch to the right upper lobe as well as the lobar branches to the right middle and right lower lobes.  No evidence of right heart strain.    Normal thyroid.  Thoracic aorta is normal in caliber and contour.  Heart is not enlarged.  No pericardial effusion.  No evidence of mediastinal, hilar, or axillary lymphadenopathy.    Trachea and bronchi are patent.  Lungs demonstrate patchy opacities in the right lower lobe corresponding with the regions pulmonary embolism which may represent pulmonary infarction versus infectious/inflammatory process.  No pleural effusion.    Limited evaluation the upper abdomen demonstrates diffuse hepatic steatosis.    Osseous structures demonstrate no evidence of fracture or dislocation.                               US Abdomen Limited (Final result)  Result time 06/03/18 10:08:49    Final result by Gulshan Starr MD (06/03/18 10:08:49)                 Impression:      Hepatic steatosis and mild hepatomegaly.    Biliary sludge.      Electronically signed by: Gulshan Starr MD  Date:    06/03/2018  Time:    10:08             Narrative:    EXAMINATION:  US ABDOMEN LIMITED    CLINICAL HISTORY:  RUQ pain;    TECHNIQUE:  Limited ultrasound of the right upper quadrant of the abdomen (including pancreas, liver, gallbladder, common bile duct, and right kidney) was performed.    COMPARISON:  None.    FINDINGS:  Liver: Mildly enlarged in size measuring 18.2 cm. Fatty liver infiltration. No focal hepatic lesions.    Gallbladder: Gallbladder demonstrates biliary sludge.  A few tiny gallstones are not excluded.   "Sonographic [place]field sign was negative.  No gallbladder wall thickening.  No pericholecystic fluid.    Biliary system: The common duct is not dilated, measuring 5 mm.  No intrahepatic ductal dilatation.    Right kidney: Normal in size with no hydronephrosis, measuring 11.5 cm.    Miscellaneous: No upper abdominal ascites.                               X-Ray Chest PA And Lateral (Final result)  Result time 06/03/18 09:24:40    Final result by Edgar Watts MD (06/03/18 09:24:40)                 Impression:      No acute findings.      Electronically signed by: Edgar Watts MD  Date:    06/03/2018  Time:    09:24             Narrative:    EXAMINATION:  XR CHEST PA AND LATERAL    CLINICAL HISTORY:  Chest pain, unspecified    TECHNIQUE:  PA and lateral views of the chest were performed.    COMPARISON:  None    FINDINGS:  The cardiac and mediastinal silhouettes appear within normal limits.   The lungs are clear bilaterally.  No acute osseous findings demonstrated.                                 Physical Exam:    GEN: AAOx3. NAD.   HEENT: NC. AT. EOMI. MOM.   RESP: No labored breathing, tachypnea or distress.  CV: RRR  EXT: Full range of motion. 3" tubular structure the girth of a noodle is felt along the medial left arm right under the skin. No overlying skin changes.   SKIN: No rashes, lacerations or excoriations.   NEURO: CN II-XII grossly normal.   PSYCH: Appropriate mood, affect and insight.     ASSESSMENT / PLAN:                                                                                      Case of 38 y.o. female with above history and findings. Will hold Heparin gtt and add on to the OR for Nexplanon removal under MAC and local.       Larissa Rollins MD  [facility] Surgery PGY-IV               "

## 2018-06-04 NOTE — ASSESSMENT & PLAN NOTE
She has been started on heparin infusion,she denies personal and family history of blood clot,no recent hospitalization or fracture or long hospital or travel,but she has left arm Nexplanon implant for birth control,for about  one year,culpprit for PE,she is stable on RA.swiotched to lovenox.consulted SW for NOAK arrangement.  Consult surgery to remove implant,will be done today,antocoagulation on hold.  UsS show no leg  DVT.  Echo to R/Oed out  heat strain.has normal EF.

## 2018-06-04 NOTE — NURSING
Call received from surgery in regards to patient's heparin drip, per anesthesia and MD, the heparin drip needs to be stopped for four hours. Was informed to stop heparin drip and it will be restarted in PACU. Understanding verbalized.

## 2018-06-04 NOTE — HOSPITAL COURSE
Ms. Elizabeth was admitted with pulmonary embolism involving the lobar arteries throughout the right lung. No evidence of right heart strain was seen on ECHO. Pt was started on heparin infusion for initial treatment and then changed to full dose lovenox. She denied personal/family history of any blood clots, no recent hospitalization or fracture/injury, no recent travels, or any periods of immobilization. However, she did have a left arm Nexplanon implant for birth control which was thought to be the culpprit for her PE. Surgery was consulted and they removed the implant on 6/4/2018 without difficulty. Pulmonary was also consulted who recommended treatment with a Xa inhibitor for 3-6 months. She was transitioned to Xarelto for anticoagulation. She was not in respiratory distress and was stable on RA. Pt was was instructed to follow up with gynecology regarding alternative non-hormonal birth control options. Case management was consulted to help arrange for her medications. Pt was enrolled in the Xarelto program and medication coverage was confirmed. Follow up was arranged with her PCP in the [facility].

## 2018-06-04 NOTE — BRIEF OP NOTE
[facility]  Brief Operative Note    SUMMARY     Surgery Date: 6/4/2018     Surgeon(s) and Role:     * Bradley Ravi MD - Primary    Assisting Surgeon: None    Pre-op Diagnosis:  Foreign body in forearm [S50.859A]    Post-op Diagnosis:  Post-Op Diagnosis Codes:     * Foreign body in forearm [S50.859A]    Procedure(s) (LRB):  REMOVAL, FOREIGN BODY, UPPER EXTREMITY (Left)    Anesthesia: General    Description of Procedure: removal foreign body    Description of the findings of the procedure: foreign body    Estimated Blood Loss: 0         Specimens:   Specimen (12h ago through future)    None

## 2018-06-04 NOTE — ASSESSMENT & PLAN NOTE
Pulmonary emoblus- likely provoked by hormonal birth control. No sign of right heart strain on ECHO.  · Agree with removal of nexplanon implant.  Patient instructed to follow up with gynecology regarding alternative non-hormonal birth control options.  · Agree with anticoagulation  · We discussed, via , the risk of bleeding while on anticoagulation as well as the need for compliance and follow up.  · Patient currently on heparin with intention to transition to warfarin  · Alternatively, a Xa inhibitor would be appropriate.  · Suggest a 3-6 month course of anticoagulation.

## 2018-06-04 NOTE — PLAN OF CARE
Problem: Patient Care Overview  Goal: Plan of Care Review  Outcome: Ongoing (interventions implemented as appropriate)   06/04/18 1605   Coping/Psychosocial   Plan Of Care Reviewed With patient     Vadim 9/10. VSS per flow sheet. Drowsy; awakens to voice. Denies pain. Incision to LUE closed with dermabond;  cdi. See chart for full assessment. Hand off report to [facility] Telemetry. Transport with portable telemetry box in progress.    Problem: Surgery Nonspecified (Adult)  Goal: Signs and Symptoms of Listed Potential Problems Will be Absent, Minimized or Managed (Surgery Nonspecified)  Signs and symptoms of listed potential problems will be absent, minimized or managed by discharge/transition of care (reference Surgery Nonspecified (Adult) CPG).   Outcome: Ongoing (interventions implemented as appropriate)   06/04/18 1605   Surgery Nonspecified   Problems Assessed (Surgery) bleeding/anemia;pain;postoperative nausea and vomiting;respiratory compromise;situational response   Problems Present (Surgery) none     Goal: Anesthesia/Sedation Recovery  Outcome: Outcome(s) achieved Date Met: 06/04/18 06/04/18 1605   Goal/Outcome Evaluation   Anesthesia/Sedation Recovery recovered to baseline;criteria met for transfer

## 2018-06-05 VITALS
OXYGEN SATURATION: 97 % | BODY MASS INDEX: 25.01 KG/M2 | HEART RATE: 72 BPM | RESPIRATION RATE: 18 BRPM | SYSTOLIC BLOOD PRESSURE: 148 MMHG | HEIGHT: 65 IN | WEIGHT: 150.13 LBS | TEMPERATURE: 99 F | DIASTOLIC BLOOD PRESSURE: 84 MMHG

## 2018-06-05 LAB
ALBUMIN SERPL BCP-MCNC: 2.8 G/DL
ALP SERPL-CCNC: 122 U/L
ALT SERPL W/O P-5'-P-CCNC: 39 U/L
ANION GAP SERPL CALC-SCNC: 8 MMOL/L
AST SERPL-CCNC: 35 U/L
BASOPHILS # BLD AUTO: 0.03 K/UL
BASOPHILS NFR BLD: 0.3 %
BILIRUB SERPL-MCNC: 0.4 MG/DL
BUN SERPL-MCNC: 6 MG/DL
CALCIUM SERPL-MCNC: 9 MG/DL
CHLORIDE SERPL-SCNC: 104 MMOL/L
CO2 SERPL-SCNC: 25 MMOL/L
CREAT SERPL-MCNC: 0.6 MG/DL
DIFFERENTIAL METHOD: ABNORMAL
EOSINOPHIL # BLD AUTO: 0.3 K/UL
EOSINOPHIL NFR BLD: 3.3 %
ERYTHROCYTE [DISTWIDTH] IN BLOOD BY AUTOMATED COUNT: 14.6 %
EST. GFR  (AFRICAN AMERICAN): >60 ML/MIN/1.73 M^2
EST. GFR  (NON AFRICAN AMERICAN): >60 ML/MIN/1.73 M^2
GLUCOSE SERPL-MCNC: 157 MG/DL
HCT VFR BLD AUTO: 38.9 %
HGB BLD-MCNC: 13.2 G/DL
LYMPHOCYTES # BLD AUTO: 3.3 K/UL
LYMPHOCYTES NFR BLD: 33.8 %
MCH RBC QN AUTO: 31 PG
MCHC RBC AUTO-ENTMCNC: 33.9 G/DL
MCV RBC AUTO: 91 FL
MONOCYTES # BLD AUTO: 1.1 K/UL
MONOCYTES NFR BLD: 11 %
NEUTROPHILS # BLD AUTO: 5 K/UL
NEUTROPHILS NFR BLD: 51.6 %
PLATELET # BLD AUTO: 153 K/UL
PMV BLD AUTO: 10.8 FL
POCT GLUCOSE: 165 MG/DL (ref 70–110)
POCT GLUCOSE: 217 MG/DL (ref 70–110)
POCT GLUCOSE: 224 MG/DL (ref 70–110)
POTASSIUM SERPL-SCNC: 3.6 MMOL/L
PROT SERPL-MCNC: 6.3 G/DL
RBC # BLD AUTO: 4.26 M/UL
SODIUM SERPL-SCNC: 137 MMOL/L
WBC # BLD AUTO: 9.68 K/UL

## 2018-06-05 PROCEDURE — 90471 IMMUNIZATION ADMIN: CPT | Performed by: HOSPITALIST

## 2018-06-05 PROCEDURE — 80053 COMPREHEN METABOLIC PANEL: CPT

## 2018-06-05 PROCEDURE — 25000003 PHARM REV CODE 250: Performed by: HOSPITALIST

## 2018-06-05 PROCEDURE — 3E0234Z INTRODUCTION OF SERUM, TOXOID AND VACCINE INTO MUSCLE, PERCUTANEOUS APPROACH: ICD-10-PCS | Performed by: HOSPITALIST

## 2018-06-05 PROCEDURE — 63600175 PHARM REV CODE 636 W HCPCS: Performed by: HOSPITALIST

## 2018-06-05 PROCEDURE — 85025 COMPLETE CBC W/AUTO DIFF WBC: CPT

## 2018-06-05 PROCEDURE — 90670 PCV13 VACCINE IM: CPT | Performed by: HOSPITALIST

## 2018-06-05 PROCEDURE — 36415 COLL VENOUS BLD VENIPUNCTURE: CPT

## 2018-06-05 RX ORDER — METFORMIN HYDROCHLORIDE 500 MG/1
500 TABLET ORAL 2 TIMES DAILY WITH MEALS
Qty: 60 TABLET | Refills: 0 | Status: SHIPPED | OUTPATIENT
Start: 2018-06-05 | End: 2018-06-05

## 2018-06-05 RX ORDER — METFORMIN HYDROCHLORIDE 500 MG/1
500 TABLET ORAL 2 TIMES DAILY WITH MEALS
Qty: 60 TABLET | Refills: 0 | Status: ON HOLD | OUTPATIENT
Start: 2018-06-05 | End: 2020-06-22 | Stop reason: SDUPTHER

## 2018-06-05 RX ORDER — METFORMIN HYDROCHLORIDE 500 MG/1
500 TABLET ORAL 2 TIMES DAILY WITH MEALS
Status: DISCONTINUED | OUTPATIENT
Start: 2018-06-05 | End: 2018-06-05 | Stop reason: HOSPADM

## 2018-06-05 RX ADMIN — RIVAROXABAN 20 MG: 20 TABLET, FILM COATED ORAL at 05:06

## 2018-06-05 RX ADMIN — PNEUMOCOCCAL 13-VALENT CONJUGATE VACCINE 0.5 ML: 2.2; 2.2; 2.2; 2.2; 2.2; 4.4; 2.2; 2.2; 2.2; 2.2; 2.2; 2.2; 2.2 INJECTION, SUSPENSION INTRAMUSCULAR at 02:06

## 2018-06-05 RX ADMIN — ENOXAPARIN SODIUM 70 MG: 100 INJECTION SUBCUTANEOUS at 04:06

## 2018-06-05 RX ADMIN — METFORMIN HYDROCHLORIDE 500 MG: 500 TABLET, FILM COATED ORAL at 05:06

## 2018-06-05 RX ADMIN — INSULIN ASPART 4 UNITS: 100 INJECTION, SOLUTION INTRAVENOUS; SUBCUTANEOUS at 12:06

## 2018-06-05 RX ADMIN — INSULIN ASPART 2 UNITS: 100 INJECTION, SOLUTION INTRAVENOUS; SUBCUTANEOUS at 09:06

## 2018-06-05 NOTE — NURSING
Bedside report given to night nurse. Pt has no sign of distress. Safety precautions are maintained with bed alarm set, bed in lowest position, bed wheels locked, and call light in reach. Chart check completed.

## 2018-06-05 NOTE — NURSING
In preparation for discharge, d/c'ed patient's saline lock, applied pressure to site, secured site with gauze and tape, no redness or swelling noted. D/C'ed patient's tele, SR, prior to removal. Patient is sitting in room waiting for ride home and discharge instructions. NAD noted.

## 2018-06-05 NOTE — DISCHARGE SUMMARY
[facility] Hospital Medicine  Discharge Summary      Patient Name: Elyssa Elizabeth  MRN: [id number]  Admission Date: 6/3/2018  Hospital Length of Stay: 2 days  Discharge Date and Time:  06/05/2018 5:43 PM  Attending Physician: Brianna Machuca MD   Discharging Provider: Brianna Machuca MD  Primary Care Provider: [facility]      HPI:   Patient is a 38-year-old female with past medical history of DM,who speaks Kenyan only who reports 5 days of right rib pain is intermittent.  She reports nothing is alleviate this pain and it radiates to the mid upper back and shoulders.  She reports current severity of pain of 10/10.  She states there is subjective fever, chills, cough.  Denies urinary symptoms such as frequency or urgency dysuria hematuria.  She states the pain is worse with a deep breath or cough.CTA chest  Show,  Extensive pulmonary embolism involving the lobar arteries throughout the right lung.  No evidence of right heart strain.  There is associated opacity in the right lower lobe which may represent pulmonary infarction versus infectious/inflammatory process.  She has been started on heparin infusion,she denies personal and family history of blood clot,no recent hospitalization or fracture or long hospital or travel,but she has left arm Nexplanon implant for birth control,for about  one year,culpprit for PE,she is stable on NC O 2.    Procedure(s) (LRB):  REMOVAL, FOREIGN BODY, UPPER EXTREMITY (Left)      Hospital Course:   Ms. Elizabeth was admitted with pulmonary embolism involving the lobar arteries throughout the right lung. No evidence of right heart strain was seen on ECHO. U/S of LE negative for DVT. Pt was started on heparin infusion for initial treatment and then changed to full dose lovenox. She denied personal/family history of any blood clots, no recent hospitalization or fracture/injury, no recent travels, or any periods of immobilization. However, she did have a left arm  Nexplanon implant for birth control which was thought to be the culpprit for her PE. Surgery was consulted and they removed the implant on 6/4/2018 without difficulty. Pulmonary was also consulted who recommended treatment with a Xa inhibitor for 3-6 months. She was transitioned to Xarelto for anticoagulation. She was not in respiratory distress and was stable on RA. Pt was was instructed to follow up with gynecology regarding alternative non-hormonal birth control options. Case management was consulted to help arrange for her medications. Pt was enrolled in the Xarelto program and medication coverage was confirmed. Follow up was arranged with her PCP in the [facility] primary care system.     Consults:   Consults         Status Ordering Provider     Inpatient consult to Pulmonology  Once     Provider:  Nel Ortiz MD    Completed DENISE MCCLENDON     Inpatient consult to Social Work  Once     Provider:  (Not yet assigned)    ABHIJEET Hurd        Service: Hospital Medicine    Final Active Diagnoses:    Diagnosis Date Noted POA    PRINCIPAL PROBLEM:  Pulmonary embolus [I26.99] 06/03/2018 Yes    Elevated LFTs [R79.89] 06/03/2018 Yes    DM (diabetes mellitus), secondary uncontrolled [E13.65] 06/03/2018 Yes      Problems Resolved During this Admission:    Diagnosis Date Noted Date Resolved POA       Discharged Condition: good    Disposition: Home or Self Care    Follow Up:  Follow-up Information     [facility] clinic. Go on 6/6/2018.    Why:  Wednesday at 9:30AM. Appointment time is for 10AM  Contact information:  Primary Care Doctor  Dr. Doris Orr  [facility]  2nd floor medicine clinic   clinic side of [address]  [phone] (ask for scheduling)                   Patient Instructions:     Diet diabetic     Activity as tolerated       Significant Diagnostic Studies:   CTA chest (6/3/2018):  Extensive pulmonary embolism involving the lobar arteries throughout the right  lung.  No evidence of right heart strain.  There is associated opacity in the right lower lobe which may represent pulmonary infarction versus infectious/inflammatory process.  Hepatic steatosis.    U/S of LE (6/3/2018):  No evidence for bilateral lower extremity deep venous thrombosis. Clinical correlation and follow up as clinically warranted.    ECHO (6/4/2018):    1 - Normal left ventricular systolic function (EF 55-60%).     2 - Normal left ventricular diastolic function.     3 - Trivial tricuspid regurgitation    Pending Diagnostic Studies:     Procedure Component Value Units Date/Time     Lower Extremity Veins Bilateral [[id number]]     Order Status:  Sent Lab Status:  No result          Medications:  Reconciled Home Medications:      Medication List      START taking these medications    metFORMIN 500 MG tablet  Commonly known as:  GLUCOPHAGE  Take 1 tablet (500 mg total) by mouth 2 (two) times daily with meals.     rivaroxaban 15 mg Tab  Commonly known as:  XARELTO  Take 1 tablet (15 mg total) by mouth 2 (two) times daily with meals.            Indwelling Lines/Drains at time of discharge:   Lines/Drains/Airways          No matching active lines, drains, or airways          Time spent on the discharge of patient: 40 minutes  Patient was seen and examined on the date of discharge and determined to be suitable for discharge.         Brianna Machuca MD  Department of Hospital Medicine  [facility]

## 2018-06-05 NOTE — DISCHARGE INSTRUCTIONS
You can  your XARELTO medication at     [id number] - [address] [address] [facility]  [place] [address]  Phone: [phone] Fax: [phone]

## 2018-06-05 NOTE — NURSING
Provided patient and patient's sister with d/c instructions and both verbalized understanding. Patient declined to wait for transport, ambulated off unit with sister. NAD noted

## 2018-06-05 NOTE — ANESTHESIA POSTPROCEDURE EVALUATION
"Anesthesia Post Evaluation    Patient: Elyssa Elizabeth    Procedure(s) Performed: Procedure(s) (LRB):  REMOVAL, FOREIGN BODY, UPPER EXTREMITY (Left)    Final Anesthesia Type: general  Patient location during evaluation: PACU  Patient participation: Yes- Able to Participate  Level of consciousness: awake and alert, oriented and awake  Post-procedure vital signs: reviewed and stable  Airway patency: patent  PONV status at discharge: No PONV  Anesthetic complications: no      Cardiovascular status: blood pressure returned to baseline  Respiratory status: unassisted, spontaneous ventilation and room air  Hydration status: euvolemic  Follow-up not needed.        Visit Vitals  /80   Pulse 77   Temp 37 °C (98.6 °F)   Resp 18   Ht 5' 5" (1.651 m)   Wt 68.1 kg (150 lb 2.1 oz)   LMP 05/11/2018   SpO2 98%   Breastfeeding? No   BMI 24.98 kg/m²       Pain/Vadim Score: Pain Assessment Performed: Yes (6/5/2018  7:12 AM)  Presence of Pain: denies (6/5/2018  7:12 AM)  Pain Rating Prior to Med Admin: 5 (6/4/2018  1:02 PM)  Pain Rating Post Med Admin: 2 (6/4/2018  1:32 PM)  Vadim Score: 10 (6/4/2018  3:55 PM)      "

## 2018-06-05 NOTE — PLAN OF CARE
Problem: Fall Risk (Adult)  Goal: Absence of Falls  Patient will demonstrate the desired outcomes by discharge/transition of care.   Outcome: Ongoing (interventions implemented as appropriate)   06/05/18 1103   Fall Risk (Adult)   Absence of Falls making progress toward outcome       Problem: Patient Care Overview  Goal: Plan of Care Review  Outcome: Outcome(s) achieved Date Met: 06/05/18 06/05/18 1103   Coping/Psychosocial   Plan Of Care Reviewed With patient       Problem: Diabetes, Type 2 (Adult)  Goal: Signs and Symptoms of Listed Potential Problems Will be Absent, Minimized or Managed (Diabetes, Type 2)  Signs and symptoms of listed potential problems will be absent, minimized or managed by discharge/transition of care (reference Diabetes, Type 2 (Adult) CPG).   Outcome: Ongoing (interventions implemented as appropriate)   06/05/18 1103   Diabetes, Type 2   Problems Assessed (Type 2 Diabetes) all   Problems Present (Type 2 Diabetes) none       Problem: VTE, DVT and PE (Adult)  Goal: Signs and Symptoms of Listed Potential Problems Will be Absent, Minimized or Managed (VTE, DVT and PE)  Signs and symptoms of listed potential problems will be absent, minimized or managed by discharge/transition of care (reference VTE, DVT and PE (Adult) CPG).   Outcome: Ongoing (interventions implemented as appropriate)   06/05/18 1103   VTE, DVT and PE   Problems Assessed (VTE, DVT, PE) all   Problems Present (VTE, DVT, PE) none       Problem: Pain, Acute (Adult)  Goal: Acceptable Pain Control/Comfort Level  Patient will demonstrate the desired outcomes by discharge/transition of care.   Outcome: Ongoing (interventions implemented as appropriate)   06/05/18 1103   Pain, Acute (Adult)   Acceptable Pain Control/Comfort Level making progress toward outcome       Problem: Surgery Nonspecified (Adult)  Goal: Signs and Symptoms of Listed Potential Problems Will be Absent, Minimized or Managed (Surgery Nonspecified)  Signs and  symptoms of listed potential problems will be absent, minimized or managed by discharge/transition of care (reference Surgery Nonspecified (Adult) CPG).   Outcome: Ongoing (interventions implemented as appropriate)   06/05/18 1103   Surgery Nonspecified   Problems Assessed (Surgery) all   Problems Present (Surgery) none

## 2018-06-05 NOTE — PROGRESS NOTES
"   [facility]      General Surgery Progress Note    06/05/2018  8:14 AM      Patient: Elyssa Elizabeth  MRN: [id number]  Admit Date: 6/3/2018  LOS: 2    POD # 1. Nexplanon Excision (LUE)     Subjective                                                                                                        "Estoy esperando que me den de ryne." ("I am waiting to be discharged.")    Events Overnight: None    Interval Events: Nexplanon removed yesterday afternoon. Heparin gtt stopped and patient changed to therapeutic Lovenox.    Patient Active Problem List   Diagnosis    Pulmonary embolus    Elevated LFTs    DM (diabetes mellitus), secondary uncontrolled       No current facility-administered medications on file prior to encounter.      No current outpatient prescriptions on file prior to encounter.       Objective                                                                                                          Vitals:    06/04/18 1928 06/05/18 0007 06/05/18 0509 06/05/18 0710   BP: 129/77 (!) 156/79 138/87 137/80   BP Location: Right arm Right arm Right arm    Patient Position: Lying Lying Sitting    Pulse: 78 77 77 77   Resp: 18 18 18 18   Temp: 97.7 °F (36.5 °C) 98.7 °F (37.1 °C) 98.8 °F (37.1 °C) 98.6 °F (37 °C)   TempSrc: Oral Oral Oral    SpO2: 97% 98% 97% 98%   Weight:   68.1 kg (150 lb 2.1 oz)    Height:           CBC   Recent Labs  Lab 06/04/18  0459 06/05/18  0413   WBC 10.15 9.68   HGB 13.4 13.2   HCT 38.7 38.9   * 153       CHEM   Recent Labs  Lab 06/04/18  0239 06/05/18  0413    137   K 3.5 3.6    104   CO2 24 25   BUN 4* 6   CREATININE 0.6 0.6   * 157*       PHYSICAL EXAM:    GEN: AAOx3. NAD.   HEENT: NC. AT. EOMI. MOM.  RESP: No SOB or respiratory distress.   CV: RRR  EXT:  Incision from surgery is intact with well-opposed wound edges, no swelling, no drainage/bleeding and no surrounding hematoma or ecchymosis.    Assessment / Plan:                     "                                                                       A/P: Case of 38 y.o. with the above history and findings. Pt doing well after excision of Nexplanon. Incision healing well. As per patient, they are planning on discharging today. Pt may follow-up with us in clinic PRN. Thank you for this consult and for allowing us to participate in this patient's care. Will sign off.     Larissa Rollins MD  [facility] Surgery PGY-IV

## 2018-06-05 NOTE — PLAN OF CARE
06/05/18 1748   Final Note   Assessment Type Final Discharge Note   Discharge Disposition Home   What phone number can be called within the next 1-3 days to see how you are doing after discharge? [phone]   Hospital Follow Up  Appt(s) scheduled? Yes   Discharge plans and expectations educations in teach back method with documentation complete? Yes     Will f/u with [facility] program on tomorrow. DC order faxed to them and confirmed fax.     Sister who is bilingual at bedside (Heather). Patient declines RUTH

## 2018-06-05 NOTE — PROGRESS NOTES
Called the [facility] assistance program and spoke with Carly. She stated that if the patient is inpatient, she will not qualify for the assistance. She needs to be DCed first.  DC orders placed. Carly requested that DC orders be faxed.     Called \[facility]\"" and they stated that she does have a diabetic MD there but no PCP.  She was scheduled with Dr. Doris Orr at [facility] 2nd floor medicine clinic, clinic side of [address] on 6/6 at 10AM and she needs to be there for 9:30AM. I notified them that she is going to be a new Xarelto patient.

## 2018-06-05 NOTE — PROGRESS NOTES
[facility]    WRITTEN HEALTHCARE AND DISCHARGE INFORMATION     Follow-up Information     [facility] clinic. Go on 6/6/2018.    Why:  Wednesday at 9:30AM. Appointment time is for 10AM  Contact information:  Primary Care Doctor  Dr. Doris Orr  [facility]  2nd floor medicine clinic   clinic side of building   [address]  [phone] (ask for scheduling)                       You can  your XARELTO medication at      [facility] - [place], [place] - [address] [facility]  [address] [facility] [address]  Phone: [phone] Fax: [phone]            You can  your XARELTO medication at      [facility] - [place], [place] - [address] [facility]  [address] [facility] [place] [address]  Phone: [phone] Fax: [phone]                             Help at Home           [phone]  After discharge for assistance [facility] On Call Nurse Care Line 24/7  Assistance    Things You are responsible For To Manage Your Care At Home:  1.    Getting your prescriptions filled   2.    Taking your medications as directed, DO NOT MISS ANY DOSES!  3.    Going to your follow-up doctor appointment. This is important because it  allow the doctor to monitor your progress and determine if  any changes need to made to your treatment plan.     Thank you for choosing [facility] for your care.  Please answer any calls you may receive from [facility] we want to continue to support you as you manage your healthcare needs. [facility] is happy to have the opportunity to serve you.     Sincerely,  Your [facility] Team,  DIMITRIOS Tong, RN;  [phone]

## 2018-06-05 NOTE — PLAN OF CARE
Problem: Fall Risk (Adult)  Goal: Absence of Falls  Patient will demonstrate the desired outcomes by discharge/transition of care.   Outcome: Ongoing (interventions implemented as appropriate)   06/05/18 0305   Fall Risk (Adult)   Absence of Falls making progress toward outcome       Problem: Patient Care Overview  Goal: Plan of Care Review  Outcome: Ongoing (interventions implemented as appropriate)   06/05/18 0305   Coping/Psychosocial   Plan Of Care Reviewed With patient       Problem: Diabetes, Type 2 (Adult)  Goal: Signs and Symptoms of Listed Potential Problems Will be Absent, Minimized or Managed (Diabetes, Type 2)  Signs and symptoms of listed potential problems will be absent, minimized or managed by discharge/transition of care (reference Diabetes, Type 2 (Adult) CPG).   Outcome: Ongoing (interventions implemented as appropriate)   06/05/18 0305   Diabetes, Type 2   Problems Present (Type 2 Diabetes) hyperglycemia      06/05/18 0305   Diabetes, Type 2   Problems Assessed (Type 2 Diabetes) hyperglycemia   Problems Present (Type 2 Diabetes) hyperglycemia       Problem: VTE, DVT and PE (Adult)  Goal: Signs and Symptoms of Listed Potential Problems Will be Absent, Minimized or Managed (VTE, DVT and PE)  Signs and symptoms of listed potential problems will be absent, minimized or managed by discharge/transition of care (reference VTE, DVT and PE (Adult) CPG).   Outcome: Ongoing (interventions implemented as appropriate)   06/05/18 0305   VTE, DVT and PE   Problems Assessed (VTE, DVT, PE) pulmonary embolism       Problem: Pain, Acute (Adult)  Goal: Acceptable Pain Control/Comfort Level  Patient will demonstrate the desired outcomes by discharge/transition of care.   Outcome: Ongoing (interventions implemented as appropriate)   06/05/18 0305   Pain, Acute (Adult)   Acceptable Pain Control/Comfort Level making progress toward outcome

## 2018-06-06 NOTE — PHYSICIAN QUERY
PT Name: Elyssa Elizabeth  MR #: [id number]     Physician Query Form - Documentation Clarification      CDS/: Rebecca Montenegro               Contact information:[email or web address]    This form is a permanent document in the medical record.     Query Date: June 6, 2018    By submitting this query, we are merely seeking further clarification of documentation. Please utilize your independent clinical judgment when addressing the question(s) below.    The Medical record reflects the following:    Supporting Clinical Findings Location in Medical Record     DM (diabetes mellitus), secondary uncontrolled  Started on IVF and  SSI,will monitor.       H&P 6/3     Glucose = 371, 181, 157       Labs 6/3 - 6/5                                                                            Doctor, Please specify diagnosis or diagnoses associated with above clinical findings.    Provider Use Only      [ x  ]  DM with hyperglycemia    [   ]  Other explanations with details__________________________________                                                                                                             [  ] Clinically undetermined

## 2018-06-06 NOTE — OP NOTE
DATE OF PROCEDURE:  06/04/2018.    PREOPERATIVE DIAGNOSIS:  Foreign body, left upper arm.    POSTOPERATIVE DIAGNOSIS:  Foreign body, left upper arm.    OPERATIVE PROCEDURE:  Excision of foreign body, left upper arm.    SURGEON:  [facility] Ravi M.D.    ASSISTANT:  Dr. Larissa Contreras.    PROCEDURE IN DETAIL:  After adequate premedication, the patient was taken to the   Operating Room and placed on the operating table in the supine position.  The   left arm prepped and draped in sterile fashion.  MAC anesthesia was induced.    Incision was made overlying the foreign body.  The incision was approximately 1   cm.  The foreign body was easily identified and removed.  The wound was closed   with 4-0 Monocryl.  Blood loss was negligible.  The patient tolerated the   procedure and was transported to Recovery in stable condition.      LATIA/CHANTALE  dd: 06/06/2018 10:05:48 (CDT)  td: 06/06/2018 11:57:16 (CDT)  Doc ID   #[id number]  Job ID #[id number]    CC:

## 2020-06-22 ENCOUNTER — HOSPITAL ENCOUNTER (OUTPATIENT)
Facility: OTHER | Age: 40
Discharge: HOME | End: 2020-06-22
Attending: EMERGENCY MEDICINE | Admitting: HOSPITALIST

## 2020-06-22 VITALS
SYSTOLIC BLOOD PRESSURE: 181 MMHG | DIASTOLIC BLOOD PRESSURE: 86 MMHG | TEMPERATURE: 99 F | WEIGHT: 150 LBS | BODY MASS INDEX: 26.58 KG/M2 | HEIGHT: 63 IN | OXYGEN SATURATION: 98 % | RESPIRATION RATE: 16 BRPM | HEART RATE: 83 BPM

## 2020-06-22 DIAGNOSIS — Z79.4 TYPE 2 DIABETES MELLITUS WITH HYPERGLYCEMIA, WITH LONG-TERM CURRENT USE OF INSULIN: ICD-10-CM

## 2020-06-22 DIAGNOSIS — N39.0 ACUTE UTI: ICD-10-CM

## 2020-06-22 DIAGNOSIS — E87.20 LACTIC ACIDOSIS: ICD-10-CM

## 2020-06-22 DIAGNOSIS — R79.89 ELEVATED LACTIC ACID LEVEL: Primary | ICD-10-CM

## 2020-06-22 DIAGNOSIS — G44.219 EPISODIC TENSION-TYPE HEADACHE, NOT INTRACTABLE: ICD-10-CM

## 2020-06-22 DIAGNOSIS — R73.9 HYPERGLYCEMIA: ICD-10-CM

## 2020-06-22 DIAGNOSIS — E11.65 TYPE 2 DIABETES MELLITUS WITH HYPERGLYCEMIA, WITH LONG-TERM CURRENT USE OF INSULIN: ICD-10-CM

## 2020-06-22 DIAGNOSIS — R79.89 ELEVATED LFTS: ICD-10-CM

## 2020-06-22 PROBLEM — G44.209 TENSION TYPE HEADACHE: Status: ACTIVE | Noted: 2020-06-22

## 2020-06-22 PROBLEM — Z72.0 TOBACCO ABUSE: Status: ACTIVE | Noted: 2020-06-22

## 2020-06-22 LAB
ALBUMIN SERPL BCP-MCNC: 3.4 G/DL (ref 3.5–5.2)
ALP SERPL-CCNC: 114 U/L (ref 55–135)
ALT SERPL W/O P-5'-P-CCNC: 52 U/L (ref 10–44)
ANION GAP SERPL CALC-SCNC: 14 MMOL/L (ref 8–16)
AST SERPL-CCNC: 79 U/L (ref 10–40)
B-HCG UR QL: NEGATIVE
BACTERIA #/AREA URNS HPF: ABNORMAL /HPF
BASOPHILS # BLD AUTO: 0.13 K/UL (ref 0–0.2)
BASOPHILS NFR BLD: 1.2 % (ref 0–1.9)
BILIRUB SERPL-MCNC: 0.6 MG/DL (ref 0.1–1)
BILIRUB UR QL STRIP: NEGATIVE
BNP SERPL-MCNC: 32 PG/ML (ref 0–99)
BUN SERPL-MCNC: 3 MG/DL (ref 6–20)
CALCIUM SERPL-MCNC: 8.7 MG/DL (ref 8.7–10.5)
CHLORIDE SERPL-SCNC: 108 MMOL/L (ref 95–110)
CLARITY UR: CLEAR
CO2 SERPL-SCNC: 19 MMOL/L (ref 23–29)
COLOR UR: YELLOW
CREAT SERPL-MCNC: 0.6 MG/DL (ref 0.5–1.4)
CTP QC/QA: YES
D DIMER PPP IA.FEU-MCNC: 0.51 MG/L FEU
DIFFERENTIAL METHOD: ABNORMAL
EOSINOPHIL # BLD AUTO: 0.4 K/UL (ref 0–0.5)
EOSINOPHIL NFR BLD: 3.4 % (ref 0–8)
ERYTHROCYTE [DISTWIDTH] IN BLOOD BY AUTOMATED COUNT: 17.2 % (ref 11.5–14.5)
EST. GFR  (AFRICAN AMERICAN): >60 ML/MIN/1.73 M^2
EST. GFR  (NON AFRICAN AMERICAN): >60 ML/MIN/1.73 M^2
ESTIMATED AVG GLUCOSE: 166 MG/DL (ref 68–131)
GLUCOSE SERPL-MCNC: 179 MG/DL (ref 70–110)
GLUCOSE UR QL STRIP: ABNORMAL
HBA1C MFR BLD HPLC: 7.4 % (ref 4–5.6)
HCT VFR BLD AUTO: 40.2 % (ref 37–48.5)
HGB BLD-MCNC: 13.7 G/DL (ref 12–16)
HGB UR QL STRIP: ABNORMAL
IMM GRANULOCYTES # BLD AUTO: 0.03 K/UL (ref 0–0.04)
IMM GRANULOCYTES NFR BLD AUTO: 0.3 % (ref 0–0.5)
KETONES UR QL STRIP: NEGATIVE
LACTATE SERPL-SCNC: 2.3 MMOL/L (ref 0.5–2.2)
LACTATE SERPL-SCNC: 2.9 MMOL/L (ref 0.5–2.2)
LACTATE SERPL-SCNC: 3.2 MMOL/L (ref 0.5–2.2)
LEUKOCYTE ESTERASE UR QL STRIP: ABNORMAL
LYMPHOCYTES # BLD AUTO: 3.7 K/UL (ref 1–4.8)
LYMPHOCYTES NFR BLD: 33.8 % (ref 18–48)
MCH RBC QN AUTO: 30.7 PG (ref 27–31)
MCHC RBC AUTO-ENTMCNC: 34.1 G/DL (ref 32–36)
MCV RBC AUTO: 90 FL (ref 82–98)
MICROSCOPIC COMMENT: ABNORMAL
MONOCYTES # BLD AUTO: 0.5 K/UL (ref 0.3–1)
MONOCYTES NFR BLD: 4.5 % (ref 4–15)
NEUTROPHILS # BLD AUTO: 6.2 K/UL (ref 1.8–7.7)
NEUTROPHILS NFR BLD: 56.8 % (ref 38–73)
NITRITE UR QL STRIP: NEGATIVE
NRBC BLD-RTO: 0 /100 WBC
PH UR STRIP: 6 [PH] (ref 5–8)
PLATELET # BLD AUTO: 297 K/UL (ref 150–350)
PMV BLD AUTO: 9.8 FL (ref 9.2–12.9)
POCT GLUCOSE: 126 MG/DL (ref 70–110)
POCT GLUCOSE: 142 MG/DL (ref 70–110)
POCT GLUCOSE: 182 MG/DL (ref 70–110)
POTASSIUM SERPL-SCNC: 3.5 MMOL/L (ref 3.5–5.1)
PROCALCITONIN SERPL IA-MCNC: 0.03 NG/ML
PROT SERPL-MCNC: 7.1 G/DL (ref 6–8.4)
PROT UR QL STRIP: NEGATIVE
RBC # BLD AUTO: 4.46 M/UL (ref 4–5.4)
RBC #/AREA URNS HPF: 15 /HPF (ref 0–4)
SARS-COV-2 RDRP RESP QL NAA+PROBE: NEGATIVE
SODIUM SERPL-SCNC: 141 MMOL/L (ref 136–145)
SP GR UR STRIP: <=1.005 (ref 1–1.03)
TROPONIN I SERPL DL<=0.01 NG/ML-MCNC: <0.006 NG/ML (ref 0–0.03)
URN SPEC COLLECT METH UR: ABNORMAL
UROBILINOGEN UR STRIP-ACNC: NEGATIVE EU/DL
WBC # BLD AUTO: 10.96 K/UL (ref 3.9–12.7)
WBC #/AREA URNS HPF: 20 /HPF (ref 0–5)

## 2020-06-22 PROCEDURE — G0378 HOSPITAL OBSERVATION PER HR: HCPCS

## 2020-06-22 PROCEDURE — 36415 COLL VENOUS BLD VENIPUNCTURE: CPT

## 2020-06-22 PROCEDURE — 83605 ASSAY OF LACTIC ACID: CPT | Mod: 91

## 2020-06-22 PROCEDURE — 93010 EKG 12-LEAD: ICD-10-PCS | Mod: ,,, | Performed by: INTERNAL MEDICINE

## 2020-06-22 PROCEDURE — 99236 PR OBSERV/HOSP SAME DATE,LEVL V: ICD-10-PCS | Mod: ,,, | Performed by: HOSPITALIST

## 2020-06-22 PROCEDURE — 96361 HYDRATE IV INFUSION ADD-ON: CPT

## 2020-06-22 PROCEDURE — 96374 THER/PROPH/DIAG INJ IV PUSH: CPT

## 2020-06-22 PROCEDURE — 87040 BLOOD CULTURE FOR BACTERIA: CPT | Mod: 59

## 2020-06-22 PROCEDURE — 94761 N-INVAS EAR/PLS OXIMETRY MLT: CPT

## 2020-06-22 PROCEDURE — 99236 HOSP IP/OBS SAME DATE HI 85: CPT | Mod: ,,, | Performed by: HOSPITALIST

## 2020-06-22 PROCEDURE — 85379 FIBRIN DEGRADATION QUANT: CPT

## 2020-06-22 PROCEDURE — 81025 URINE PREGNANCY TEST: CPT | Performed by: EMERGENCY MEDICINE

## 2020-06-22 PROCEDURE — 25000003 PHARM REV CODE 250: Performed by: EMERGENCY MEDICINE

## 2020-06-22 PROCEDURE — 99285 EMERGENCY DEPT VISIT HI MDM: CPT | Mod: 25

## 2020-06-22 PROCEDURE — 93005 ELECTROCARDIOGRAM TRACING: CPT

## 2020-06-22 PROCEDURE — 85025 COMPLETE CBC W/AUTO DIFF WBC: CPT

## 2020-06-22 PROCEDURE — 93010 ELECTROCARDIOGRAM REPORT: CPT | Mod: ,,, | Performed by: INTERNAL MEDICINE

## 2020-06-22 PROCEDURE — 87086 URINE CULTURE/COLONY COUNT: CPT

## 2020-06-22 PROCEDURE — 80053 COMPREHEN METABOLIC PANEL: CPT

## 2020-06-22 PROCEDURE — 83880 ASSAY OF NATRIURETIC PEPTIDE: CPT

## 2020-06-22 PROCEDURE — 81000 URINALYSIS NONAUTO W/SCOPE: CPT

## 2020-06-22 PROCEDURE — 84145 PROCALCITONIN (PCT): CPT

## 2020-06-22 PROCEDURE — 83036 HEMOGLOBIN GLYCOSYLATED A1C: CPT

## 2020-06-22 PROCEDURE — 84484 ASSAY OF TROPONIN QUANT: CPT

## 2020-06-22 PROCEDURE — 63600175 PHARM REV CODE 636 W HCPCS: Performed by: EMERGENCY MEDICINE

## 2020-06-22 PROCEDURE — U0002 COVID-19 LAB TEST NON-CDC: HCPCS

## 2020-06-22 PROCEDURE — 82962 GLUCOSE BLOOD TEST: CPT

## 2020-06-22 PROCEDURE — 25000003 PHARM REV CODE 250: Performed by: NURSE PRACTITIONER

## 2020-06-22 RX ORDER — SODIUM CHLORIDE 0.9 % (FLUSH) 0.9 %
10 SYRINGE (ML) INJECTION
Status: DISCONTINUED | OUTPATIENT
Start: 2020-06-22 | End: 2020-06-22 | Stop reason: HOSPADM

## 2020-06-22 RX ORDER — METFORMIN HYDROCHLORIDE 500 MG/1
1000 TABLET ORAL 2 TIMES DAILY WITH MEALS
Start: 2020-06-22 | End: 2021-06-22

## 2020-06-22 RX ORDER — IBUPROFEN 200 MG
24 TABLET ORAL
Status: DISCONTINUED | OUTPATIENT
Start: 2020-06-22 | End: 2020-06-22

## 2020-06-22 RX ORDER — INSULIN GLARGINE 100 [IU]/ML
14 INJECTION, SOLUTION SUBCUTANEOUS DAILY
COMMUNITY

## 2020-06-22 RX ORDER — IBUPROFEN 200 MG
24 TABLET ORAL
Status: DISCONTINUED | OUTPATIENT
Start: 2020-06-22 | End: 2020-06-22 | Stop reason: HOSPADM

## 2020-06-22 RX ORDER — ONDANSETRON 8 MG/1
8 TABLET, ORALLY DISINTEGRATING ORAL EVERY 8 HOURS PRN
Status: DISCONTINUED | OUTPATIENT
Start: 2020-06-22 | End: 2020-06-22 | Stop reason: HOSPADM

## 2020-06-22 RX ORDER — IBUPROFEN 200 MG
16 TABLET ORAL
Status: DISCONTINUED | OUTPATIENT
Start: 2020-06-22 | End: 2020-06-22

## 2020-06-22 RX ORDER — GLUCAGON 1 MG
1 KIT INJECTION
Status: DISCONTINUED | OUTPATIENT
Start: 2020-06-22 | End: 2020-06-22

## 2020-06-22 RX ORDER — ACETAMINOPHEN 325 MG/1
650 TABLET ORAL EVERY 4 HOURS PRN
Status: DISCONTINUED | OUTPATIENT
Start: 2020-06-22 | End: 2020-06-22 | Stop reason: HOSPADM

## 2020-06-22 RX ORDER — IBUPROFEN 200 MG
16 TABLET ORAL
Status: DISCONTINUED | OUTPATIENT
Start: 2020-06-22 | End: 2020-06-22 | Stop reason: HOSPADM

## 2020-06-22 RX ORDER — SODIUM CHLORIDE 0.9 % (FLUSH) 0.9 %
10 SYRINGE (ML) INJECTION
Status: DISCONTINUED | OUTPATIENT
Start: 2020-06-22 | End: 2020-06-22

## 2020-06-22 RX ORDER — ONDANSETRON 8 MG/1
8 TABLET, ORALLY DISINTEGRATING ORAL EVERY 8 HOURS PRN
Status: DISCONTINUED | OUTPATIENT
Start: 2020-06-22 | End: 2020-06-22

## 2020-06-22 RX ORDER — ONDANSETRON 2 MG/ML
4 INJECTION INTRAMUSCULAR; INTRAVENOUS EVERY 8 HOURS PRN
Status: DISCONTINUED | OUTPATIENT
Start: 2020-06-22 | End: 2020-06-22

## 2020-06-22 RX ORDER — ACETAMINOPHEN 325 MG/1
650 TABLET ORAL EVERY 4 HOURS PRN
Status: DISCONTINUED | OUTPATIENT
Start: 2020-06-22 | End: 2020-06-22

## 2020-06-22 RX ORDER — INSULIN ASPART 100 [IU]/ML
1-10 INJECTION, SOLUTION INTRAVENOUS; SUBCUTANEOUS
Status: DISCONTINUED | OUTPATIENT
Start: 2020-06-22 | End: 2020-06-22 | Stop reason: HOSPADM

## 2020-06-22 RX ORDER — INSULIN ASPART 100 [IU]/ML
1-10 INJECTION, SOLUTION INTRAVENOUS; SUBCUTANEOUS
Status: DISCONTINUED | OUTPATIENT
Start: 2020-06-22 | End: 2020-06-22

## 2020-06-22 RX ORDER — SODIUM CHLORIDE 9 MG/ML
INJECTION, SOLUTION INTRAVENOUS CONTINUOUS
Status: DISCONTINUED | OUTPATIENT
Start: 2020-06-22 | End: 2020-06-22 | Stop reason: HOSPADM

## 2020-06-22 RX ORDER — GLUCAGON 1 MG
1 KIT INJECTION
Status: DISCONTINUED | OUTPATIENT
Start: 2020-06-22 | End: 2020-06-22 | Stop reason: HOSPADM

## 2020-06-22 RX ORDER — LEVOFLOXACIN 5 MG/ML
750 INJECTION, SOLUTION INTRAVENOUS
Status: COMPLETED | OUTPATIENT
Start: 2020-06-22 | End: 2020-06-22

## 2020-06-22 RX ADMIN — SODIUM CHLORIDE: 0.9 INJECTION, SOLUTION INTRAVENOUS at 06:06

## 2020-06-22 RX ADMIN — SODIUM CHLORIDE 1000 ML: 0.9 INJECTION, SOLUTION INTRAVENOUS at 02:06

## 2020-06-22 RX ADMIN — LEVOFLOXACIN 750 MG: 5 INJECTION, SOLUTION INTRAVENOUS at 04:06

## 2020-06-22 NOTE — ASSESSMENT & PLAN NOTE
- As above, patient's glucose level is higher than normal, although not extraordinarily high.  - HbA1c pending.  - Follow up with PCP for adjustments, diabetic education.

## 2020-06-22 NOTE — ASSESSMENT & PLAN NOTE
- Mild elevation in transaminases noted, likely due to dehydration.  - Repeat labs on follow up with PCP.

## 2020-06-22 NOTE — PLAN OF CARE
Initial Discharge Planning Assessment:  Patient admitted on 6/22/20    Chart reviewed, Care plan discussed with treatment team,  attending Dr Vanessa    PCP updated in Epic: [facility] in [place]  Pharmacy, updated in [facility]: [facility] in [place]    DME at home: none    Current dispo: home  Transportation: Friend to drive home    Power of  or Living Will: none    Case management  to follow.  No anticipated DC needs from CM perspective.           06/22/20 2589   Discharge Assessment   Assessment Type Discharge Planning Assessment   Confirmed/corrected address and phone number on facesheet? Yes   Assessment information obtained from? Patient   Communicated expected length of stay with patient/caregiver yes   Prior to hospitilization cognitive status: Alert/Oriented   Prior to hospitalization functional status: Independent   Current cognitive status: Alert/Oriented   Current Functional Status: Independent   Lives With friend(s)   Able to Return to Prior Arrangements yes   Is patient able to care for self after discharge? Yes   Who are your caregiver(s) and their phone number(s)? Sister: Heather Elizabeth [phone]   Patient's perception of discharge disposition home or selfcare   Readmission Within the Last 30 Days no previous admission in last 30 days   Patient currently being followed by outpatient case management? No   Patient currently receives any other outside agency services? No   Equipment Currently Used at Home none   Do you have any problems affording any of your prescribed medications? No   Is the patient taking medications as prescribed? yes   Does the patient have transportation home? Yes   Transportation Anticipated family or friend will provide   Does the patient receive services at the Coumadin Clinic? No   Discharge Plan A Home   Discharge Plan B Home with family   DME Needed Upon Discharge  none   Patient/Family in Agreement with Plan yes

## 2020-06-22 NOTE — ASSESSMENT & PLAN NOTE
- Tension headache associated with muscle aching, responded to Tylenol.  Suspect lactic acid level is also a contributor.  - She feels well currently and does not feel she needs to stay in hospital.  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  Needs to drink plenty of water.

## 2020-06-22 NOTE — ED TRIAGE NOTES
Pt came to ED via EMS c/o muscle aches, hyperglycemia, dizziness, fever, weakness, chills, SOB x 1 week. Pt reports being in a crowd on Sunday before symptoms started.  Pt denies cough and HA.  Pt is Moldovan speaking only.

## 2020-06-22 NOTE — ED NOTES
Pt rounding complete.  Pt sleeping with HOB elevated.RR even and unlabored,  Call light within reach.  Will continue to monitor.

## 2020-06-22 NOTE — ED PROVIDER NOTES
"Encounter Date: 6/22/2020       History     Chief Complaint   Patient presents with    Fatigue     X 6 days with, "body aches, chills, insomnia, drowsiness and hyperglycemia" X 6 days     HPI   My history differs from that at triage.  I interviewed pt in pt's native language (Argentine), which I am fluent in.  Patient presents with 2 days malaise, fevers, chills.  No sick contacts.  Three days ago patient went to a court date, otherwise states that she was under strict isolation in the days prior to the court date.  Yesterday she noted some vague constant chest discomfort that radiated to her neck, associated with frontal headache.  She notes some hyperglycemic readings in the high 200s associated with polyuria and polydipsia.  She denies any cough, reports some very mild shortness of breath since yesterday.     Review of patient's allergies indicates:  No Known Allergies  Past Medical History:   Diagnosis Date    Diabetes mellitus     Hypertension     Vaginal delivery     pt reported     Past Surgical History:   Procedure Laterality Date    REMOVAL OF FOREIGN BODY FROM UPPER EXTREMITY Left 6/4/2018    Procedure: REMOVAL, FOREIGN BODY, UPPER EXTREMITY;  Surgeon: Bradley Ravi MD;  Location: [facility] OR;  Service: General;  Laterality: Left;     History reviewed. No pertinent family history.  Social History     Tobacco Use    Smoking status: Former Smoker     Packs/day: 0.50     Types: Cigarettes    Smokeless tobacco: Current User   Substance Use Topics    Alcohol use: No    Drug use: No     Review of Systems  ROS: As per HPI and below:   General: Notes fevers, chills.   HENT: No facial pain.  No rhinorrhea.  Eyes: Negative for eye pain.   Cardiovascular:  Notes chest pain.   Respiratory:  Notes dyspnea. No cough.   GI: No abdominal pain. No nausea. No vomiting. No diarrhea.   : polyuria.   Endo:  Hyperglycemia.  Polydipsia.  Skin: No rashes.   Neuro:  No syncope.  No focal deficits. Notes lightheadedness. " "  Musculoskeletal: No extremity pain.  All other systems reviewed and are negative.    Physical Exam     Initial Vitals [06/22/20 0122]   BP Pulse Resp Temp SpO2   126/81 87 18 97.9 °F (36.6 °C) 95 %      MAP       --         Physical Exam  Nursing note and vitals reviewed.  /80   Pulse 85   Temp 97.9 °F (36.6 °C) (Oral)   Resp 18   Ht 5' 3" (1.6 m)   Wt 68 kg (150 lb)   SpO2 98%   BMI 26.57 kg/m²   Constitutional: AAOx3. No distress. Tired appearance.   Eyes: EOMI. No discharge. Anicteric.  Cardiovascular: Normal rate. No murmur, no gallop and no friction rub heard.   Pulmonary/Chest: No respiratory distress. Effort normal. No wheezes, no rales, no rhonchi.   Abdominal: Bowel sounds normal. Soft. No distension and no mass. There is no tenderness. There is no rebound, no guarding, no tenderness at McBurney's point.  Musculoskeletal: Normal range of motion. No CVA tenderness.   Neurological: GCS 15. Alert and oriented to person, place, and time. No gross cranial nerve, light touch or strength deficit. Coordination normal.   Skin: Skin is warm and dry.   EXT: 2+ radial pulses.   Psychiatric: Behavior is normal. Judgment normal.    ED Course   Procedures  Labs Reviewed   CBC W/ AUTO DIFFERENTIAL - Abnormal; Notable for the following components:       Result Value    RDW 17.2 (*)     All other components within normal limits   COMPREHENSIVE METABOLIC PANEL - Abnormal; Notable for the following components:    CO2 19 (*)     Glucose 179 (*)     BUN, Bld 3 (*)     Albumin 3.4 (*)     AST 79 (*)     ALT 52 (*)     All other components within normal limits   URINALYSIS, REFLEX TO URINE CULTURE - Abnormal; Notable for the following components:    Specific Gravity, UA <=1.005 (*)     Glucose, UA 1+ (*)     Occult Blood UA 2+ (*)     Leukocytes, UA 1+ (*)     All other components within normal limits    Narrative:     Preferred Collection Type->Urine, Clean Catch  Specimen Source->Urine   LACTIC ACID, PLASMA - " Abnormal; Notable for the following components:    Lactate (Lactic Acid) 3.2 (*)     All other components within normal limits   URINALYSIS MICROSCOPIC - Abnormal; Notable for the following components:    RBC, UA 15 (*)     WBC, UA 20 (*)     All other components within normal limits    Narrative:     Preferred Collection Type->Urine, Clean Catch  Specimen Source->Urine   LACTIC ACID, PLASMA - Abnormal; Notable for the following components:    Lactate (Lactic Acid) 2.9 (*)     All other components within normal limits   POCT GLUCOSE - Abnormal; Notable for the following components:    POCT Glucose 182 (*)     All other components within normal limits   CULTURE, BLOOD   CULTURE, BLOOD   CULTURE, URINE   TROPONIN I   B-TYPE NATRIURETIC PEPTIDE   SARS-COV-2 RNA AMPLIFICATION, QUAL   PROCALCITONIN   D DIMER, QUANTITATIVE   POCT URINE PREGNANCY   POCT GLUCOSE MONITORING CONTINUOUS          Imaging Results          X-Ray Chest AP Portable (Final result)  Result time 06/22/20 03:14:59    Final result by Edgar Martin MD (06/22/20 03:14:59)                 Impression:      No convincing radiographic evidence of acute intrathoracic process on this single view.      Electronically signed by: Edgar Martin MD  Date:    06/22/2020  Time:    03:14             Narrative:    EXAMINATION:  XR CHEST AP PORTABLE    CLINICAL HISTORY:  hyperglycemia;    TECHNIQUE:  Single frontal view of the chest was performed.    COMPARISON:  06/03/2018    FINDINGS:  Cardiac monitoring leads overlie the chest.  Cardiomediastinal silhouette appears within normal limits.  Lungs appear symmetrically expanded without evidence of confluent airspace consolidation.  No evidence of significant pleural effusion or pneumothorax.  Visualized osseous structures appear grossly intact.                                              Attending Attestation:             Attending ED Notes:   Patient is a 40-year-old female with hypertension, diabetes, recurrent  UTIs who presents with 2 days chest pain, malaise, shortness of breath, fevers, chills.  She denies any dysuria, back pain.  Initial differential included sepsis, UTI, pyelonephritis, COVID19 infection, pneumonia, gross metabolic abnormality, acute coronary syndrome, hyperglycemia, DKA.  Acute pulmonary embolus was considered but appeared unlikely initially.          ED Course as of Jun 22 0547 Mon Jun 22, 2020   0359 I independently reviewed and interpreted EKG which shows normal sinus rhythm at 76 beats per minute, no STEMI, no ischemic changes, normal intervals.  No acute change compared to prior tracing.          [RC]   0537 I independently reviewed and interpreted labs which are notable for elevated lactic acid, urinalysis suggestive of acute UTI, negative COVID-19 screen, negative troponin.  Patient lactic acid remained elevated after initial IV fluid bolus.  I have discussed the patient history, exam, findings with hospitalist NP Marvin, who accepts the patient for Dr. Vanessa.    Sepsis Perfusion Assessment: I attest, a sepsis perfusion exam was performed within 6 hours of potential sepsis presentation, following fluid resuscitation.                [RC]   1877 I discussed the findings, plan to admit the patient with her in her native Korean.     [RC]      ED Course User Index  [RC] Alexander Woodward MD                Clinical Impression:       ICD-10-CM ICD-9-CM   1. Elevated lactic acid level  R79.89 276.2   2. Hyperglycemia  R73.9 790.29   3. Acute UTI  N39.0 599.0             ED Disposition Condition    Observation                           Alexander Woodward MD  06/22/20 0539       Alexander Woodward MD  06/22/20 0561

## 2020-06-22 NOTE — ASSESSMENT & PLAN NOTE
- Counseled patient to quit.  No need for patch as she does not smoke often and will only be here for a few more hours.

## 2020-06-22 NOTE — NURSING
VSS. AAOx4.Pt eager & in agreement w/ DC. VU of DC instructions--paperwork passed & explained. IV removed w/ cath tip intact, WNL. Voiding, ambulating, & tolerating PO well. Pt. walked to garage to meet family member. (DIMITRIOS Jesus (unit director) walked with pt.) Free from falls, injury, or skin breakdown this hospital admission. Pt. in NAD.

## 2020-06-22 NOTE — SUBJECTIVE & OBJECTIVE
Past Medical History:   Diagnosis Date    Diabetes mellitus     Pulmonary embolism 2018    Vaginal delivery     4        Past Surgical History:   Procedure Laterality Date    REMOVAL OF FOREIGN BODY FROM UPPER EXTREMITY Left 2018    Procedure: REMOVAL, FOREIGN BODY, UPPER EXTREMITY;  Surgeon: Bradley Ravi MD;  Location: [facility];  Service: General;  Laterality: Left;       Review of patient's allergies indicates:  No Known Allergies    No current facility-administered medications on file prior to encounter.      Current Outpatient Medications on File Prior to Encounter   Medication Sig    insulin (LANTUS SOLOSTAR U-100 INSULIN) glargine 100 units/mL (3mL) SubQ pen Inject 14 Units into the skin once daily.    metFORMIN (GLUCOPHAGE) 500 MG tablet Take 1 tablet (500 mg total) by mouth 2 (two) times daily with meals. (Patient taking differently: Take 1,000 mg by mouth 2 (two) times daily with meals. )    [DISCONTINUED] rivaroxaban (XARELTO) 15 mg Tab Take 1 tablet (15 mg total) by mouth 2 (two) times daily with meals.     Family History     Problem Relation (Age of Onset)    Diabetes Mother        Tobacco Use    Smoking status: Current Some Day Smoker     Packs/day: 0.50     Types: Cigarettes    Smokeless tobacco: Current User    Tobacco comment: Smokes occasionally   Substance and Sexual Activity    Alcohol use: Yes     Frequency: Monthly or less     Comment: Has beer or tequila on special occasions only    Drug use: No    Sexual activity: Not on file     Review of Systems   Constitutional: Positive for chills and fatigue. Negative for fever.   HENT: Negative for rhinorrhea and sore throat.    Eyes: Negative for photophobia and visual disturbance.   Respiratory: Negative for cough and shortness of breath.    Cardiovascular: Negative for chest pain and palpitations.   Gastrointestinal: Negative for constipation, diarrhea, nausea and vomiting.   Endocrine: Negative for polydipsia and polyuria.    Genitourinary: Negative for dysuria and frequency.   Musculoskeletal: Positive for myalgias. Negative for back pain and gait problem.   Neurological: Positive for headaches. Negative for dizziness and weakness.   Psychiatric/Behavioral: Negative for confusion and dysphoric mood.     Objective:     Vital Signs (Most Recent):  Temp: 97.8 °F (36.6 °C) (06/22/20 0734)  Pulse: 80 (06/22/20 0857)  Resp: 18 (06/22/20 0857)  BP: 119/76 (06/22/20 0734)  SpO2: 99 % (06/22/20 0857) Vital Signs (24h Range):  Temp:  [97.7 °F (36.5 °C)-97.9 °F (36.6 °C)] 97.8 °F (36.6 °C)  Pulse:  [68-87] 80  Resp:  [16-18] 18  SpO2:  [95 %-99 %] 99 %  BP: (101-126)/(59-81) 119/76     Weight: 68 kg (150 lb)  Body mass index is 26.57 kg/m².    Physical Exam  Constitutional:       Appearance: She is well-developed.   HENT:      Head: Normocephalic.   Eyes:      Conjunctiva/sclera: Conjunctivae normal.      Pupils: Pupils are equal, round, and reactive to light.   Neck:      Musculoskeletal: Neck supple.      Thyroid: No thyromegaly.   Cardiovascular:      Rate and Rhythm: Normal rate and regular rhythm.      Heart sounds: Normal heart sounds. No murmur. No friction rub. No gallop.    Pulmonary:      Effort: Pulmonary effort is normal.      Breath sounds: Normal breath sounds.   Abdominal:      General: Bowel sounds are normal. There is no distension.      Palpations: Abdomen is soft.      Tenderness: There is no abdominal tenderness.   Musculoskeletal: Normal range of motion.   Lymphadenopathy:      Cervical: No cervical adenopathy.   Skin:     General: Skin is warm and dry.      Findings: No rash.   Neurological:      Mental Status: She is alert and oriented to person, place, and time.      Comments: Strength equal and symmetric   Psychiatric:         Behavior: Behavior normal.         Thought Content: Thought content normal.           CRANIAL NERVES     CN III, IV, VI   Pupils are equal, round, and reactive to light.       Significant Labs: All  pertinent labs within the past 24 hours have been reviewed.    Significant Imaging: I have reviewed all pertinent imaging results/findings within the past 24 hours.

## 2020-06-22 NOTE — H&P
"[facility] Hospital Medicine  History & Physical    Patient Name: Elyssa Elizabeth  MRN: [id number]  Admission Date: 6/22/2020  Attending Physician: Amarilis Vanessa MD   Primary Care Provider: [facility]         Patient information was obtained from patient, past medical records and ER records.     Subjective:     Principal Problem:Lactic acidosis    Chief Complaint:   Chief Complaint   Patient presents with    Fatigue     X 6 days with, "body aches, chills, insomnia, drowsiness and hyperglycemia" X 6 days        HPI: Ms. Elizabeth is a 40 year old woman who speaks Guamanian only, so H&P was assisted by the [facility]  service.  She is diabetic and presented with symptoms of headaches and muscle soreness for the last week or so.  This was associated with chills but no fever.  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  Yesterday she and a co-worker were discussing how much her arms were aching with having to  heavy trash to dispose.  She has had some chills associated with her symptoms but no fever, although yesterday was working outside and it was hot and humid.  She has been feeling thirsty and drinking a lot of water, and her BG has been higher than normal.  Her headaches are improved with Tylenol but she still feels some pain in her forehead after taking it.  She does not have dysuria or frequency.  In ED she was afebrile.  CBC and chemistry were normal except for CO2 19, and lactic acid level was 3.2, and 2 hours later was still 2.9 after getting IV fluids.  UA had some blood and leukocytes, but it was a clean catch and she is menstruating.  She was given an IV dose of Levaquin in the ED and placed in observation.    Medical history includes a PE associated with a contraceptive implant in 2018.  The implant was removed after the PE, and she took Xarelto for 6 months.  For the diabetes she is on a long-acting insulin (?Lantus) 14 units daily and " metformin 1000 mg twice daily with meals.  She used to smoke cigarettes daily but now has one once in awhile.  She rarely drinks alcohol, only on special occasions.  She has 4 children.  She sees a doctor she says is named Nayely, does not know her surname.    Past Medical History:   Diagnosis Date    Diabetes mellitus     Pulmonary embolism 2018    Vaginal delivery     4        Past Surgical History:   Procedure Laterality Date    REMOVAL OF FOREIGN BODY FROM UPPER EXTREMITY Left 2018    Procedure: REMOVAL, FOREIGN BODY, UPPER EXTREMITY;  Surgeon: Bradley Ravi MD;  Location: [facility] OR;  Service: General;  Laterality: Left;       Review of patient's allergies indicates:  No Known Allergies    No current facility-administered medications on file prior to encounter.      Current Outpatient Medications on File Prior to Encounter   Medication Sig    insulin (LANTUS SOLOSTAR U-100 INSULIN) glargine 100 units/mL (3mL) SubQ pen Inject 14 Units into the skin once daily.    metFORMIN (GLUCOPHAGE) 500 MG tablet Take 1 tablet (500 mg total) by mouth 2 (two) times daily with meals. (Patient taking differently: Take 1,000 mg by mouth 2 (two) times daily with meals. )    [DISCONTINUED] rivaroxaban (XARELTO) 15 mg Tab Take 1 tablet (15 mg total) by mouth 2 (two) times daily with meals.     Family History     Problem Relation (Age of Onset)    Diabetes Mother        Tobacco Use    Smoking status: Current Some Day Smoker     Packs/day: 0.50     Types: Cigarettes    Smokeless tobacco: Current User    Tobacco comment: Smokes occasionally   Substance and Sexual Activity    Alcohol use: Yes     Frequency: Monthly or less     Comment: Has beer or tequila on special occasions only    Drug use: No    Sexual activity: Not on file     Review of Systems   Constitutional: Positive for chills and fatigue. Negative for fever.   HENT: Negative for rhinorrhea and sore throat.    Eyes: Negative for photophobia and visual  disturbance.   Respiratory: Negative for cough and shortness of breath.    Cardiovascular: Negative for chest pain and palpitations.   Gastrointestinal: Negative for constipation, diarrhea, nausea and vomiting.   Endocrine: Negative for polydipsia and polyuria.   Genitourinary: Negative for dysuria and frequency.   Musculoskeletal: Positive for myalgias. Negative for back pain and gait problem.   Neurological: Positive for headaches. Negative for dizziness and weakness.   Psychiatric/Behavioral: Negative for confusion and dysphoric mood.     Objective:     Vital Signs (Most Recent):  Temp: 97.8 °F (36.6 °C) (06/22/20 0734)  Pulse: 80 (06/22/20 0857)  Resp: 18 (06/22/20 0857)  BP: 119/76 (06/22/20 0734)  SpO2: 99 % (06/22/20 0857) Vital Signs (24h Range):  Temp:  [97.7 °F (36.5 °C)-97.9 °F (36.6 °C)] 97.8 °F (36.6 °C)  Pulse:  [68-87] 80  Resp:  [16-18] 18  SpO2:  [95 %-99 %] 99 %  BP: (101-126)/(59-81) 119/76     Weight: 68 kg (150 lb)  Body mass index is 26.57 kg/m².    Physical Exam  Constitutional:       Appearance: She is well-developed.   HENT:      Head: Normocephalic.   Eyes:      Conjunctiva/sclera: Conjunctivae normal.      Pupils: Pupils are equal, round, and reactive to light.   Neck:      Musculoskeletal: Neck supple.      Thyroid: No thyromegaly.   Cardiovascular:      Rate and Rhythm: Normal rate and regular rhythm.      Heart sounds: Normal heart sounds. No murmur. No friction rub. No gallop.    Pulmonary:      Effort: Pulmonary effort is normal.      Breath sounds: Normal breath sounds.   Abdominal:      General: Bowel sounds are normal. There is no distension.      Palpations: Abdomen is soft.      Tenderness: There is no abdominal tenderness.   Musculoskeletal: Normal range of motion.   Lymphadenopathy:      Cervical: No cervical adenopathy.   Skin:     General: Skin is warm and dry.      Findings: No rash.   Neurological:      Mental Status: She is alert and oriented to person, place, and time.       Comments: Strength equal and symmetric   Psychiatric:         Behavior: Behavior normal.         Thought Content: Thought content normal.           CRANIAL NERVES     CN III, IV, VI   Pupils are equal, round, and reactive to light.       Significant Labs: All pertinent labs within the past 24 hours have been reviewed.    Significant Imaging: I have reviewed all pertinent imaging results/findings within the past 24 hours.    Assessment/Plan:     * Lactic acidosis  - Presented with symptoms of muscle aching and headaches.  - Findings included mildly elevated AST and lactic acidosis with lactate 3.2.  Responded to IV fluids with decrease in lactic acid to 2.9 after 2 hours of IV fluids.  Levaquin also given but was unnecessary as she has no evidence of a UTI.  I would not even call this asymptomatic bacteriuria, as it was a clean catch while she is on her menses.  - Suspect lactic acidosis is due to a combination of hyperglycemia, metformin use and mild dehydration from heavy labor in the hot weather.  - I would be reluctant to stop her metformin at this point but she should follow up with her PCP as soon as possible for repeat labs after leaving here.  - Will continue IV fluids for a few more hours and repeat the lactic acid level around noon.  If close to normal will discharge her.      Type 2 diabetes mellitus with hyperglycemia, with long-term current use of insulin  - As above, patient's glucose level is higher than normal, although not extraordinarily high.  - HbA1c pending.  - Follow up with PCP for adjustments, diabetic education.    Tobacco abuse  - Counseled patient to quit.  No need for patch as she does not smoke often and will only be here for a few more hours.    Tension type headache  - Tension headache associated with muscle aching, responded to Tylenol.  Suspect lactic acid level is also a contributor.  - She feels well currently and does not feel she needs to stay in hospital.  - We discussed her  work environment, which is stressful and she does heavy labor outside.  Needs to drink plenty of water.    Elevated LFTs  - Mild elevation in transaminases noted, likely due to dehydration.  - Repeat labs on follow up with PCP.        VTE Risk Mitigation (From admission, onward)         Ordered     IP VTE HIGH RISK PATIENT  Once      06/22/20 0613     Reason for No Pharmacological VTE Prophylaxis  Once     Question:  Reasons:  Answer:  Already adequately anticoagulated on oral Anticoagulants    06/22/20 0613                   Amarilis Albright MD  Department of Hospital Medicine   [facility]

## 2020-06-22 NOTE — ED NOTES
Pt rounding completed, Pt resting with HOB elevated. NAD noted, RR even and unlabored, call light within reach. Will continue to monitor.

## 2020-06-22 NOTE — PLAN OF CARE
Pt states friend will arrive after 4:30 to provide transportation home.    No further DC needs from CM perspective.       06/22/20 1541   Final Note   Assessment Type Final Discharge Note   Anticipated Discharge Disposition Home   What phone number can be called within the next 1-3 days to see how you are doing after discharge? [phone]   Hospital Follow Up  Appt(s) scheduled? Yes   Discharge plans and expectations educations in teach back method with documentation complete? Yes   Right Care Referral Info   Post Acute Recommendation No Care

## 2020-06-22 NOTE — HPI
Ms. Elizabeth is a 40 year old woman who speaks Finnish only, so H&P was assisted by the [facility]  service.  She is diabetic and presented with symptoms of headaches and muscle soreness for the last week or so.  This was associated with chills but no fever.  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  Yesterday she and a co-worker were discussing how much her arms were aching with having to  heavy trash to dispose.  She has had some chills associated with her symptoms but no fever, although yesterday was working outside and it was hot and humid.  She has been feeling thirsty and drinking a lot of water, and her BG has been higher than normal.  Her headaches are improved with Tylenol but she still feels some pain in her forehead after taking it.  She does not have dysuria or frequency.  In ED she was afebrile.  CBC and chemistry were normal except for CO2 19, and lactic acid level was 3.2, and 2 hours later was still 2.9 after getting IV fluids.  UA had some blood and leukocytes, but it was a clean catch and she is menstruating.  She was given an IV dose of Levaquin in the ED and placed in observation.    Medical history includes a PE associated with a contraceptive implant in 2018.  The implant was removed after the PE, and she took Xarelto for 6 months.  For the diabetes she is on a long-acting insulin (?Lantus) 14 units daily and metformin 1000 mg twice daily with meals.  She used to smoke cigarettes daily but now has one once in awhile.  She rarely drinks alcohol, only on special occasions.  She has 4 children.  She sees a doctor she says is named Nayely, does not know her surname.

## 2020-06-22 NOTE — NURSING
Pt arrived via wheelchair. All belongings at bedside. Pt instructed on how to use call bell. Pt oriented to room. Pt AAOx4. Bed locked and in lowest position. Call light w/i reach.

## 2020-06-22 NOTE — ASSESSMENT & PLAN NOTE
- Presented with symptoms of muscle aching and headaches.  - Findings included mildly elevated AST and lactic acidosis with lactate 3.2.  Responded to IV fluids with decrease in lactic acid to 2.9 after 2 hours of IV fluids.  Levaquin also given but was unnecessary as she has no evidence of a UTI.  I would not even call this asymptomatic bacteriuria, as it was a clean catch while she is on her menses.  - Suspect lactic acidosis is due to a combination of hyperglycemia, metformin use and mild dehydration from heavy labor in the hot weather.  - I would be reluctant to stop her metformin at this point but she should follow up with her PCP as soon as possible for repeat labs after leaving here.  - Will continue IV fluids for a few more hours and repeat the lactic acid level around noon.  If close to normal will discharge her.

## 2020-06-23 LAB — BACTERIA UR CULT: NO GROWTH

## 2020-06-23 NOTE — DISCHARGE SUMMARY
[facility]  Hospital Medicine  Discharge Summary      Patient Name: Elyssa Elizabeth  MRN: [id number]  Admission Date: 6/22/2020  Hospital Length of Stay: 0 days  Discharge Date and Time: 6/22/2020  4:00 PM  Attending Physician: No att. providers found   Discharging Provider: Amarilis Albright MD  Primary Care Provider: [facility]      HPI:   Ms. Elizabeth is a 40 year old woman who speaks Greek only, so H&P was assisted by the [facility]  service.  She is diabetic and presented with symptoms of headaches and muscle soreness for the last week or so.  This was associated with chills but no fever.  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  Yesterday she and a co-worker were discussing how much her arms were aching with having to  heavy trash to dispose.  She has had some chills associated with her symptoms but no fever, although yesterday was working outside and it was hot and humid.  She has been feeling thirsty and drinking a lot of water, and her BG has been higher than normal.  Her headaches are improved with Tylenol but she still feels some pain in her forehead after taking it.  She does not have dysuria or frequency.  In ED she was afebrile.  CBC and chemistry were normal except for CO2 19, and lactic acid level was 3.2, and 2 hours later was still 2.9 after getting IV fluids.  UA had some blood and leukocytes, but it was a clean catch and she is menstruating.  She was given an IV dose of Levaquin in the ED and placed in observation.    Medical history includes a PE associated with a contraceptive implant in 2018.  The implant was removed after the PE, and she took Xarelto for 6 months.  For the diabetes she is on a long-acting insulin (?Lantus) 14 units daily and metformin 1000 mg twice daily with meals.  She used to smoke cigarettes daily but now has one once in awhile.  She rarely drinks alcohol, only on special occasions.  She has 4  children.  She sees a doctor she says is named Nayely, does not know her surname.          Hospital Course:   Patient was admitted on IV fluids and a sliding scale of insulin.  Her lactic acid level normalized within 4 hours on IV fluids and she felt comfortable being discharged home.  It's unclear at this point how much the metformin is a contributing factor to the transient lactic acidosis.  I have told her to go ahead and resume it and follow up with her PCP soon for repeat labs.         Final Active Diagnoses:    Diagnosis Date Noted POA    PRINCIPAL PROBLEM:  Lactic acidosis [E87.2] 06/22/2020 Yes    Type 2 diabetes mellitus with hyperglycemia, with long-term current use of insulin [E11.65, Z79.4] 06/22/2020 Not Applicable    Tension type headache [G44.209] 06/22/2020 Yes    Tobacco abuse [Z72.0] 06/22/2020 Yes    Elevated LFTs [R94.5] 06/03/2018 Yes      Problems Resolved During this Admission:       Discharged Condition: stable    Disposition: Home or Self Care    Follow Up:  Follow-up Information     [facility].    Why: Follow up in 1-2 weeks               Patient Instructions:      Diet diabetic     Activity as tolerated       Medications:  Reconciled Home Medications:      Medication List      CONTINUE taking these medications    LANTUS SOLOSTAR U-100 INSULIN glargine 100 units/mL (3mL) SubQ pen  Generic drug: insulin  Inject 14 Units into the skin once daily.     metFORMIN 500 MG tablet  Commonly known as: GLUCOPHAGE  Take 2 tablets (1,000 mg total) by mouth 2 (two) times daily with meals.            Time spent on the discharge of patient: <30 minutes  Patient was seen and examined on the date of discharge and determined to be suitable for discharge.         Amarilis Albright MD  Department of Hospital Medicine  [facility]

## 2020-06-23 NOTE — HOSPITAL COURSE
Patient was admitted on IV fluids and a sliding scale of insulin.  Her lactic acid level normalized within 4 hours on IV fluids and she felt comfortable being discharged home.  It's unclear at this point how much the metformin is a contributing factor to the transient lactic acidosis.  I have told her to go ahead and resume it and follow up with her PCP soon for repeat labs.

## 2020-06-27 LAB
BACTERIA BLD CULT: NORMAL
BACTERIA BLD CULT: NORMAL
